# Patient Record
Sex: FEMALE | Race: WHITE | URBAN - NONMETROPOLITAN AREA
[De-identification: names, ages, dates, MRNs, and addresses within clinical notes are randomized per-mention and may not be internally consistent; named-entity substitution may affect disease eponyms.]

---

## 2017-01-01 ENCOUNTER — AMBULATORY - GICH (OUTPATIENT)
Dept: LAB | Facility: OTHER | Age: 82
End: 2017-01-01

## 2017-01-01 ENCOUNTER — OFFICE VISIT - GICH (OUTPATIENT)
Dept: INTERNAL MEDICINE | Facility: OTHER | Age: 82
End: 2017-01-01

## 2017-01-01 ENCOUNTER — COMMUNICATION - GICH (OUTPATIENT)
Dept: INTERNAL MEDICINE | Facility: OTHER | Age: 82
End: 2017-01-01

## 2017-01-01 ENCOUNTER — HISTORY (OUTPATIENT)
Dept: FAMILY MEDICINE | Facility: OTHER | Age: 82
End: 2017-01-01

## 2017-01-01 ENCOUNTER — AMBULATORY - GICH (OUTPATIENT)
Dept: INTERNAL MEDICINE | Facility: OTHER | Age: 82
End: 2017-01-01

## 2017-01-01 ENCOUNTER — OFFICE VISIT - GICH (OUTPATIENT)
Dept: FAMILY MEDICINE | Facility: OTHER | Age: 82
End: 2017-01-01

## 2017-01-01 ENCOUNTER — AMBULATORY - GICH (OUTPATIENT)
Dept: SCHEDULING | Facility: OTHER | Age: 82
End: 2017-01-01

## 2017-01-01 ENCOUNTER — HISTORY (OUTPATIENT)
Dept: INTERNAL MEDICINE | Facility: OTHER | Age: 82
End: 2017-01-01

## 2017-01-01 ENCOUNTER — HISTORY (OUTPATIENT)
Dept: EMERGENCY MEDICINE | Facility: OTHER | Age: 82
End: 2017-01-01

## 2017-01-01 ENCOUNTER — HOSPITAL ENCOUNTER (OUTPATIENT)
Dept: RADIOLOGY | Facility: OTHER | Age: 82
End: 2017-12-06
Attending: INTERNAL MEDICINE

## 2017-01-01 DIAGNOSIS — R42 DIZZINESS AND GIDDINESS: ICD-10-CM

## 2017-01-01 DIAGNOSIS — L30.9 DERMATITIS: ICD-10-CM

## 2017-01-01 DIAGNOSIS — E11.9 TYPE 2 DIABETES MELLITUS WITHOUT COMPLICATIONS (H): ICD-10-CM

## 2017-01-01 DIAGNOSIS — N18.4 CHRONIC KIDNEY DISEASE, STAGE IV (SEVERE) (H): ICD-10-CM

## 2017-01-01 DIAGNOSIS — I10 ESSENTIAL (PRIMARY) HYPERTENSION: ICD-10-CM

## 2017-01-01 DIAGNOSIS — Z95.0 PRESENCE OF CARDIAC PACEMAKER: ICD-10-CM

## 2017-01-01 DIAGNOSIS — L03.115 CELLULITIS OF RIGHT LOWER EXTREMITY: ICD-10-CM

## 2017-01-01 DIAGNOSIS — R53.1 WEAKNESS: ICD-10-CM

## 2017-01-01 DIAGNOSIS — R32 URINARY INCONTINENCE: ICD-10-CM

## 2017-01-01 DIAGNOSIS — R60.0 LOCALIZED EDEMA: ICD-10-CM

## 2017-01-01 DIAGNOSIS — D64.9 ANEMIA: ICD-10-CM

## 2017-01-01 DIAGNOSIS — N28.9 DISORDER OF KIDNEY AND URETER: ICD-10-CM

## 2017-01-01 DIAGNOSIS — I44.2 ATRIOVENTRICULAR BLOCK, COMPLETE (H): ICD-10-CM

## 2017-01-01 DIAGNOSIS — R21 RASH AND OTHER NONSPECIFIC SKIN ERUPTION: ICD-10-CM

## 2017-01-01 DIAGNOSIS — D63.8 ANEMIA IN OTHER CHRONIC DISEASES CLASSIFIED ELSEWHERE: ICD-10-CM

## 2017-01-01 DIAGNOSIS — N39.41 URGE INCONTINENCE: ICD-10-CM

## 2017-01-01 DIAGNOSIS — R79.89 OTHER SPECIFIED ABNORMAL FINDINGS OF BLOOD CHEMISTRY: ICD-10-CM

## 2017-01-01 DIAGNOSIS — F33.1 MAJOR DEPRESSIVE DISORDER, RECURRENT, MODERATE (H): ICD-10-CM

## 2017-01-01 DIAGNOSIS — M19.90 OSTEOARTHRITIS: ICD-10-CM

## 2017-01-01 DIAGNOSIS — M10.9 GOUT: ICD-10-CM

## 2017-01-01 DIAGNOSIS — M16.11 PRIMARY OSTEOARTHRITIS OF RIGHT HIP: ICD-10-CM

## 2017-01-01 DIAGNOSIS — R53.83 OTHER FATIGUE: ICD-10-CM

## 2017-01-01 DIAGNOSIS — I51.9 HEART DISEASE: ICD-10-CM

## 2017-01-01 DIAGNOSIS — K21.9 GASTRO-ESOPHAGEAL REFLUX DISEASE WITHOUT ESOPHAGITIS: ICD-10-CM

## 2017-01-01 DIAGNOSIS — M79.661 PAIN OF RIGHT LOWER LEG: ICD-10-CM

## 2017-01-01 DIAGNOSIS — N30.01 ACUTE CYSTITIS WITH HEMATURIA: ICD-10-CM

## 2017-01-01 LAB
ABSOLUTE BASOPHILS - HISTORICAL: 0 THOU/CU MM
ABSOLUTE BASOPHILS - HISTORICAL: 0.1 THOU/CU MM
ABSOLUTE EOSINOPHILS - HISTORICAL: 0.2 THOU/CU MM
ABSOLUTE EOSINOPHILS - HISTORICAL: 0.2 THOU/CU MM
ABSOLUTE EOSINOPHILS - HISTORICAL: 0.3 THOU/CU MM
ABSOLUTE EOSINOPHILS - HISTORICAL: 0.3 THOU/CU MM
ABSOLUTE IMMATURE GRANULOCYTES(METAS,MYELOS,PROS) - HISTORICAL: 0 THOU/CU MM
ABSOLUTE LYMPHOCYTES - HISTORICAL: 1.8 THOU/CU MM (ref 0.9–2.9)
ABSOLUTE LYMPHOCYTES - HISTORICAL: 2.1 THOU/CU MM (ref 0.9–2.9)
ABSOLUTE LYMPHOCYTES - HISTORICAL: 2.3 THOU/CU MM (ref 0.9–2.9)
ABSOLUTE LYMPHOCYTES - HISTORICAL: 2.4 THOU/CU MM (ref 0.9–2.9)
ABSOLUTE MONOCYTES - HISTORICAL: 0.6 THOU/CU MM
ABSOLUTE MONOCYTES - HISTORICAL: 0.6 THOU/CU MM
ABSOLUTE MONOCYTES - HISTORICAL: 0.7 THOU/CU MM
ABSOLUTE MONOCYTES - HISTORICAL: 0.7 THOU/CU MM
ABSOLUTE NEUTROPHILS - HISTORICAL: 3.1 THOU/CU MM (ref 1.7–7)
ABSOLUTE NEUTROPHILS - HISTORICAL: 3.2 THOU/CU MM (ref 1.7–7)
ABSOLUTE NEUTROPHILS - HISTORICAL: 4.1 THOU/CU MM (ref 1.7–7)
ABSOLUTE NEUTROPHILS - HISTORICAL: 5.2 THOU/CU MM (ref 1.7–7)
ALBUMIN SERPL-MCNC: 3 G/DL (ref 3.5–5.7)
ALBUMIN SERPL-MCNC: 3.2 G/DL (ref 3.5–5.7)
ANION GAP - HISTORICAL: 10 (ref 5–18)
ANION GAP - HISTORICAL: 10 (ref 5–18)
ANION GAP - HISTORICAL: 14 (ref 5–18)
ANION GAP - HISTORICAL: 6 (ref 5–18)
ANION GAP - HISTORICAL: 7 (ref 5–18)
ANION GAP - HISTORICAL: 9 (ref 5–18)
BACTERIA URINE: ABNORMAL BACTERIA/HPF
BASOPHILS # BLD AUTO: 0.4 %
BASOPHILS # BLD AUTO: 0.4 %
BASOPHILS # BLD AUTO: 0.6 %
BASOPHILS # BLD AUTO: 0.9 %
BILIRUB UR QL: NEGATIVE
BUN SERPL-MCNC: 60 MG/DL (ref 7–25)
BUN SERPL-MCNC: 69 MG/DL (ref 7–25)
BUN SERPL-MCNC: 70 MG/DL (ref 7–25)
BUN SERPL-MCNC: 73 MG/DL (ref 7–25)
BUN SERPL-MCNC: 76 MG/DL (ref 7–25)
BUN SERPL-MCNC: 80 MG/DL (ref 7–25)
BUN/CREAT RATIO - HISTORICAL: 16
BUN/CREAT RATIO - HISTORICAL: 17
BUN/CREAT RATIO - HISTORICAL: 18
BUN/CREAT RATIO - HISTORICAL: 19
BUN/CREAT RATIO - HISTORICAL: 20
BUN/CREAT RATIO - HISTORICAL: 22
CALCIUM SERPL-MCNC: 7.7 MG/DL (ref 8.6–10.3)
CALCIUM SERPL-MCNC: 7.8 MG/DL (ref 8.6–10.3)
CALCIUM SERPL-MCNC: 8 MG/DL (ref 8.6–10.3)
CALCIUM SERPL-MCNC: 8.2 MG/DL (ref 8.6–10.3)
CALCIUM SERPL-MCNC: 8.3 MG/DL (ref 8.6–10.3)
CALCIUM SERPL-MCNC: 8.9 MG/DL (ref 8.6–10.3)
CHLORIDE SERPLBLD-SCNC: 108 MMOL/L (ref 98–107)
CHLORIDE SERPLBLD-SCNC: 110 MMOL/L (ref 98–107)
CHLORIDE SERPLBLD-SCNC: 111 MMOL/L (ref 98–107)
CHLORIDE SERPLBLD-SCNC: 111 MMOL/L (ref 98–107)
CHLORIDE SERPLBLD-SCNC: 113 MMOL/L (ref 98–107)
CHLORIDE SERPLBLD-SCNC: 113 MMOL/L (ref 98–107)
CLARITY, URINE: ABNORMAL CLARITY
CLARITY, URINE: ABNORMAL CLARITY
CLARITY, URINE: CLEAR CLARITY
CO2 SERPL-SCNC: 17 MMOL/L (ref 21–31)
CO2 SERPL-SCNC: 17 MMOL/L (ref 21–31)
CO2 SERPL-SCNC: 18 MMOL/L (ref 21–31)
CO2 SERPL-SCNC: 19 MMOL/L (ref 21–31)
COLOR UR: YELLOW COLOR
CREAT SERPL-MCNC: 2.79 MG/DL (ref 0.7–1.3)
CREAT SERPL-MCNC: 3.71 MG/DL (ref 0.7–1.3)
CREAT SERPL-MCNC: 3.74 MG/DL (ref 0.7–1.3)
CREAT SERPL-MCNC: 4.06 MG/DL (ref 0.7–1.3)
CREAT SERPL-MCNC: 4.5 MG/DL (ref 0.7–1.3)
CREAT SERPL-MCNC: 4.66 MG/DL (ref 0.7–1.3)
CREATININE RANDOM URINE: 75.8 MG/DL (ref 47–110)
CULTURE - HISTORICAL: NORMAL
D-DIMER, QUANTITATIVE NG/ML - HISTORICAL: 917 NG/ML
ELP INTERP,SERUM - HISTORICAL: ABNORMAL
ELP,ALBUMIN - HISTORICAL: 2.83 G/DL (ref 3.31–5.31)
ELP,ALPHA 1 - HISTORICAL: 0.35 G/DL (ref 0.19–0.42)
ELP,ALPHA 2 - HISTORICAL: 0.85 G/DL (ref 0.44–1.03)
ELP,BETA - HISTORICAL: 0.68 G/DL (ref 0.52–1.05)
ELP,GAMMA - HISTORICAL: 0.9 G/DL (ref 0.59–1.46)
EOSINOPHIL NFR BLD AUTO: 3.2 %
EOSINOPHIL NFR BLD AUTO: 3.5 %
EOSINOPHIL NFR BLD AUTO: 3.8 %
EOSINOPHIL NFR BLD AUTO: 4.1 %
EPITHELIAL CELLS: ABNORMAL EPI/HPF
ERYTHROCYTE [DISTWIDTH] IN BLOOD BY AUTOMATED COUNT: 14.1 % (ref 11.5–15.5)
ERYTHROCYTE [DISTWIDTH] IN BLOOD BY AUTOMATED COUNT: 14.7 % (ref 11.5–15.5)
ERYTHROCYTE [DISTWIDTH] IN BLOOD BY AUTOMATED COUNT: 14.9 % (ref 11.5–15.5)
ERYTHROCYTE [DISTWIDTH] IN BLOOD BY AUTOMATED COUNT: 15.1 % (ref 11.5–15.5)
ERYTHROCYTE [SEDIMENTATION RATE] IN BLOOD: 82 MM/HR
FERRITIN SERPL-MCNC: 142.4 NG/ML (ref 23.9–336.2)
FOLATE: 14.7 NG/ML
GFR IF NOT AFRICAN AMERICAN - HISTORICAL: 10 ML/MIN/1.73M2
GFR IF NOT AFRICAN AMERICAN - HISTORICAL: 11 ML/MIN/1.73M2
GFR IF NOT AFRICAN AMERICAN - HISTORICAL: 12 ML/MIN/1.73M2
GFR IF NOT AFRICAN AMERICAN - HISTORICAL: 16 ML/MIN/1.73M2
GFR IF NOT AFRICAN AMERICAN - HISTORICAL: 9 ML/MIN/1.73M2
GFR IF NOT AFRICAN AMERICAN - HISTORICAL: 9 ML/MIN/1.73M2
GLUCOSE SERPL-MCNC: 132 MG/DL (ref 70–105)
GLUCOSE SERPL-MCNC: 137 MG/DL (ref 70–105)
GLUCOSE SERPL-MCNC: 83 MG/DL (ref 70–105)
GLUCOSE SERPL-MCNC: 86 MG/DL (ref 70–105)
GLUCOSE SERPL-MCNC: 87 MG/DL (ref 70–105)
GLUCOSE SERPL-MCNC: 94 MG/DL (ref 70–105)
GLUCOSE URINE: 100 MG/DL
GLUCOSE URINE: 100 MG/DL
GLUCOSE URINE: NEGATIVE MG/DL
HCT VFR BLD AUTO: 22.5 % (ref 33–51)
HCT VFR BLD AUTO: 23.3 % (ref 33–51)
HCT VFR BLD AUTO: 24.3 % (ref 33–51)
HCT VFR BLD AUTO: 25.7 % (ref 33–51)
HEMOGLOBIN: 7.3 G/DL (ref 12–16)
HEMOGLOBIN: 7.8 G/DL (ref 12–16)
HEMOGLOBIN: 7.8 G/DL (ref 12–16)
HEMOGLOBIN: 8.1 G/DL (ref 12–16)
HEMOGLOBIN: 8.5 G/DL (ref 12–16)
IMMATURE GRANULOCYTES(METAS,MYELOS,PROS) - HISTORICAL: 0.3 %
IMMATURE GRANULOCYTES(METAS,MYELOS,PROS) - HISTORICAL: 0.4 %
IRON BINDING CAP: 253.68 UG/DL (ref 245–400)
IRON SATURATION: 21 % (ref 20–55)
IRON: 54 UG/DL (ref 50–212)
KETONES UR QL: NEGATIVE MG/DL
LEUKOCYTE ESTERASE URINE: ABNORMAL
LYMPHOCYTES NFR BLD AUTO: 27.1 % (ref 20–44)
LYMPHOCYTES NFR BLD AUTO: 28.8 % (ref 20–44)
LYMPHOCYTES NFR BLD AUTO: 31.2 % (ref 20–44)
LYMPHOCYTES NFR BLD AUTO: 37.1 % (ref 20–44)
MCH RBC QN AUTO: 30.8 PG (ref 26–34)
MCH RBC QN AUTO: 31.1 PG (ref 26–34)
MCH RBC QN AUTO: 32 PG (ref 26–34)
MCH RBC QN AUTO: 32 PG (ref 26–34)
MCHC RBC AUTO-ENTMCNC: 32.1 G/DL (ref 32–36)
MCHC RBC AUTO-ENTMCNC: 32.4 G/DL (ref 32–36)
MCHC RBC AUTO-ENTMCNC: 33.1 G/DL (ref 32–36)
MCHC RBC AUTO-ENTMCNC: 33.5 G/DL (ref 32–36)
MCV RBC AUTO: 95 FL (ref 80–100)
MCV RBC AUTO: 96 FL (ref 80–100)
MCV RBC AUTO: 97 FL (ref 80–100)
MONOCYTES NFR BLD AUTO: 10.7 %
MONOCYTES NFR BLD AUTO: 8 %
MONOCYTES NFR BLD AUTO: 9 %
MONOCYTES NFR BLD AUTO: 9.8 %
NEUTROPHILS NFR BLD AUTO: 49.5 % (ref 42–72)
NEUTROPHILS NFR BLD AUTO: 53.1 % (ref 42–72)
NEUTROPHILS NFR BLD AUTO: 56.6 % (ref 42–72)
NEUTROPHILS NFR BLD AUTO: 60.9 % (ref 42–72)
NITRITE UR QL STRIP: NEGATIVE
NITRITE UR QL STRIP: NEGATIVE
NITRITE UR QL STRIP: POSITIVE
OCCULT BLOOD,URINE - HISTORICAL: ABNORMAL
PARATHYROID HORMONE INTACT: 223.5 PG/ML (ref 12–88)
PH UR: 6 [PH]
PHOSPHATE SERPL-MCNC: 5.9 MG/DL (ref 2.5–5)
PHOSPHATE SERPL-MCNC: 6.1 MG/DL (ref 2.5–5)
PLATELET # BLD AUTO: 214 THOU/CU MM (ref 140–440)
PLATELET # BLD AUTO: 231 THOU/CU MM (ref 140–440)
PLATELET # BLD AUTO: 239 THOU/CU MM (ref 140–440)
PLATELET # BLD AUTO: 262 THOU/CU MM (ref 140–440)
PMV BLD: 8.7 FL (ref 6.5–11)
PMV BLD: 8.8 FL (ref 6.5–11)
PMV BLD: 9.1 FL (ref 6.5–11)
PMV BLD: 9.4 FL (ref 6.5–11)
POTASSIUM SERPL-SCNC: 4.3 MMOL/L (ref 3.5–5.1)
POTASSIUM SERPL-SCNC: 4.6 MMOL/L (ref 3.5–5.1)
POTASSIUM SERPL-SCNC: 4.8 MMOL/L (ref 3.5–5.1)
POTASSIUM SERPL-SCNC: 4.8 MMOL/L (ref 3.5–5.1)
POTASSIUM SERPL-SCNC: 5.1 MMOL/L (ref 3.5–5.1)
POTASSIUM SERPL-SCNC: 5.5 MMOL/L (ref 3.5–5.1)
PROT SERPL-MCNC: 5.6 G/DL (ref 6–8)
PROT/CREAT RATIO,UR - HISTORICAL: 9.3
PROTEIN QUALITATIVE,URINE - HISTORICAL: 100 MG/DL
PROTEIN QUALITATIVE,URINE - HISTORICAL: 100 MG/DL
PROTEIN QUALITATIVE,URINE - HISTORICAL: >=300 MG/DL
PROTEIN QUANT,RAND URINE - HISTORICAL: 705 MG/DL (ref 1–14)
RBC - HISTORICAL: ABNORMAL /HPF
RED BLOOD COUNT - HISTORICAL: 2.35 MIL/CU MM (ref 4–5.2)
RED BLOOD COUNT - HISTORICAL: 2.44 MIL/CU MM (ref 4–5.2)
RED BLOOD COUNT - HISTORICAL: 2.53 MIL/CU MM (ref 4–5.2)
RED BLOOD COUNT - HISTORICAL: 2.66 MIL/CU MM (ref 4–5.2)
RENAL EPITHELIAL CELLS - HISTORICAL: ABNORMAL
SODIUM SERPL-SCNC: 137 MMOL/L (ref 133–143)
SODIUM SERPL-SCNC: 137 MMOL/L (ref 133–143)
SODIUM SERPL-SCNC: 138 MMOL/L (ref 133–143)
SODIUM SERPL-SCNC: 141 MMOL/L (ref 133–143)
SP GR UR STRIP: 1.02
UIBC (UNSATURATED) - HISTORICAL: 199.68 MG/DL
URATE SERPL-MCNC: 4.9 MG/DL (ref 4.4–7.6)
URATE SERPL-MCNC: 5.1 MG/DL (ref 4.4–7.6)
UROBILINOGEN,QUALITATIVE - HISTORICAL: NORMAL EU/DL
VIT B12 SERPL-MCNC: 298 PG/ML (ref 180–914)
WBC - HISTORICAL: >100 /HPF
WBC - HISTORICAL: ABNORMAL /HPF
WBC - HISTORICAL: ABNORMAL /HPF
WHITE BLOOD COUNT - HISTORICAL: 5.8 THOU/CU MM (ref 4.5–11)
WHITE BLOOD COUNT - HISTORICAL: 6.6 THOU/CU MM (ref 4.5–11)
WHITE BLOOD COUNT - HISTORICAL: 7.3 THOU/CU MM (ref 4.5–11)
WHITE BLOOD COUNT - HISTORICAL: 8.5 THOU/CU MM (ref 4.5–11)

## 2017-01-01 ASSESSMENT — PATIENT HEALTH QUESTIONNAIRE - PHQ9
SUM OF ALL RESPONSES TO PHQ QUESTIONS 1-9: 0

## 2017-01-18 ENCOUNTER — HISTORY (OUTPATIENT)
Dept: INTERNAL MEDICINE | Facility: OTHER | Age: 82
End: 2017-01-18

## 2017-01-18 ENCOUNTER — OFFICE VISIT - GICH (OUTPATIENT)
Dept: INTERNAL MEDICINE | Facility: OTHER | Age: 82
End: 2017-01-18

## 2017-01-18 DIAGNOSIS — F33.1 MAJOR DEPRESSIVE DISORDER, RECURRENT, MODERATE (H): ICD-10-CM

## 2017-01-18 DIAGNOSIS — R30.0 DYSURIA: ICD-10-CM

## 2017-01-18 DIAGNOSIS — I51.9 HEART DISEASE: ICD-10-CM

## 2017-01-18 DIAGNOSIS — E11.9 TYPE 2 DIABETES MELLITUS WITHOUT COMPLICATIONS (H): ICD-10-CM

## 2017-01-18 DIAGNOSIS — N39.41 URGE INCONTINENCE: ICD-10-CM

## 2017-01-18 DIAGNOSIS — N18.4 CHRONIC KIDNEY DISEASE, STAGE IV (SEVERE) (H): ICD-10-CM

## 2017-01-18 DIAGNOSIS — M10.9 GOUT: ICD-10-CM

## 2017-01-18 DIAGNOSIS — K21.9 GASTRO-ESOPHAGEAL REFLUX DISEASE WITHOUT ESOPHAGITIS: ICD-10-CM

## 2017-01-18 DIAGNOSIS — I10 ESSENTIAL (PRIMARY) HYPERTENSION: ICD-10-CM

## 2017-01-18 DIAGNOSIS — E03.9 HYPOTHYROIDISM: ICD-10-CM

## 2017-01-18 LAB
ANION GAP - HISTORICAL: 9 (ref 5–18)
BUN SERPL-MCNC: 44 MG/DL (ref 7–25)
BUN/CREAT RATIO - HISTORICAL: 18
CALCIUM SERPL-MCNC: 9.1 MG/DL (ref 8.6–10.3)
CHLORIDE SERPLBLD-SCNC: 107 MMOL/L (ref 98–107)
CO2 SERPL-SCNC: 22 MMOL/L (ref 21–31)
CREAT SERPL-MCNC: 2.38 MG/DL (ref 0.7–1.3)
ESTIMATED AVERAGE GLUCOSE: 105 MG/DL
GFR IF NOT AFRICAN AMERICAN - HISTORICAL: 19 ML/MIN/1.73M2
GLUCOSE SERPL-MCNC: 91 MG/DL (ref 70–105)
HEMOGLOBIN A1C MONITORING (POCT) - HISTORICAL: 5.3 % (ref 4–6.2)
HEMOGLOBIN: 9.6 G/DL (ref 12–16)
MCV RBC AUTO: 100 FL (ref 80–100)
POTASSIUM SERPL-SCNC: 4.6 MMOL/L (ref 3.5–5.1)
SODIUM SERPL-SCNC: 138 MMOL/L (ref 133–143)
T4 FREE SERPL-MCNC: 1.01 NG/DL (ref 0.58–1.64)
TSH - HISTORICAL: 1.27 UIU/ML (ref 0.34–5.6)

## 2017-01-24 ENCOUNTER — AMBULATORY - GICH (OUTPATIENT)
Dept: LAB | Facility: OTHER | Age: 82
End: 2017-01-24

## 2017-01-24 DIAGNOSIS — R30.0 DYSURIA: ICD-10-CM

## 2017-01-24 LAB
BACTERIA URINE: ABNORMAL BACTERIA/HPF
BILIRUB UR QL: NEGATIVE
CLARITY, URINE: CLEAR CLARITY
COLOR UR: YELLOW COLOR
EPITHELIAL CELLS: ABNORMAL EPI/HPF
GLUCOSE URINE: NEGATIVE MG/DL
KETONES UR QL: NEGATIVE MG/DL
LEUKOCYTE ESTERASE URINE: ABNORMAL
NITRITE UR QL STRIP: NEGATIVE
OCCULT BLOOD,URINE - HISTORICAL: ABNORMAL
PH UR: 5.5 [PH]
PROTEIN QUALITATIVE,URINE - HISTORICAL: >=300 MG/DL
RBC - HISTORICAL: ABNORMAL /HPF
SP GR UR STRIP: 1.02
UROBILINOGEN,QUALITATIVE - HISTORICAL: NORMAL EU/DL
WBC - HISTORICAL: >100 /HPF

## 2017-02-09 ENCOUNTER — HISTORY (OUTPATIENT)
Dept: EMERGENCY MEDICINE | Facility: OTHER | Age: 82
End: 2017-02-09

## 2017-03-15 ENCOUNTER — HISTORY (OUTPATIENT)
Dept: INTERNAL MEDICINE | Facility: OTHER | Age: 82
End: 2017-03-15

## 2017-03-15 ENCOUNTER — OFFICE VISIT - GICH (OUTPATIENT)
Dept: INTERNAL MEDICINE | Facility: OTHER | Age: 82
End: 2017-03-15

## 2017-03-15 DIAGNOSIS — I44.2 ATRIOVENTRICULAR BLOCK, COMPLETE (H): ICD-10-CM

## 2017-03-15 DIAGNOSIS — R55 SYNCOPE AND COLLAPSE: ICD-10-CM

## 2017-03-15 DIAGNOSIS — R21 RASH AND OTHER NONSPECIFIC SKIN ERUPTION: ICD-10-CM

## 2017-03-15 DIAGNOSIS — N18.4 CHRONIC KIDNEY DISEASE, STAGE IV (SEVERE) (H): ICD-10-CM

## 2017-03-15 LAB
ANION GAP - HISTORICAL: 10 (ref 5–18)
BUN SERPL-MCNC: 50 MG/DL (ref 7–25)
BUN/CREAT RATIO - HISTORICAL: 21
CALCIUM SERPL-MCNC: 8.9 MG/DL (ref 8.6–10.3)
CHLORIDE SERPLBLD-SCNC: 111 MMOL/L (ref 98–107)
CO2 SERPL-SCNC: 22 MMOL/L (ref 21–31)
CREAT SERPL-MCNC: 2.39 MG/DL (ref 0.7–1.3)
FERRITIN SERPL-MCNC: 111.8 NG/ML (ref 23.9–336.2)
GFR IF NOT AFRICAN AMERICAN - HISTORICAL: 19 ML/MIN/1.73M2
GLUCOSE SERPL-MCNC: 89 MG/DL (ref 70–105)
HEMOGLOBIN: 9 G/DL (ref 12–16)
IRON: 68 UG/DL (ref 50–212)
MCV RBC AUTO: 97 FL (ref 80–100)
POTASSIUM SERPL-SCNC: 4.5 MMOL/L (ref 3.5–5.1)
SODIUM SERPL-SCNC: 143 MMOL/L (ref 133–143)

## 2017-05-06 ENCOUNTER — HISTORY (OUTPATIENT)
Dept: EMERGENCY MEDICINE | Facility: OTHER | Age: 82
End: 2017-05-06

## 2017-12-27 NOTE — PROGRESS NOTES
Patient Information     Patient Name MRN Marisol Howell [id number] Female 10/5/1930      Progress Notes by Paul Morrison MD at 11/10/2017  1:20 PM     Author:  Paul Morrison MD Service:  (none) Author Type:  Physician     Filed:  11/10/2017  2:41 PM Encounter Date:  11/10/2017 Status:  Signed     :  Paul Morrison MD (Physician)            SUBJECTIVE:    Marisol Martin is a 87 y.o. female who presents for a recheck.    HPI Comments: This patient comes in today for follow-up. The last time she was in, her renal function had deteriorated. She was much more anemic than she used to be as well. I told her to stop her lisinopril and start taking iron which she had not been doing on a regular basis. She has done those things. Her swelling has been about the same. Energy level continues to be about the same which is not very good. She does not have any new issues or complaints. She was having some trouble taking the iron but is now taking it with some yogurt and that seems to work better. Her baseline creatinine is approximately 3. Her baseline hemoglobin is approximately 9.    With stopping her ACE inhibitor because of the renal dysfunction, her blood pressure has gone up. They are recording values in the range of 200+ systolic. We are not getting anywhere near that here in the clinic so that is somewhat confusing.      Allergies     Allergen  Reactions     Sulindac Stomach Upset   ,   Current Outpatient Prescriptions     Medication  Sig     acetaminophen (TYLENOL) 325 mg tablet 1 at bedtime     allopurinol (ZYLOPRIM) 100 mg tablet TAKE 1 TABLET BY MOUTH ONCE DAILY     amLODIPine (NORVASC) 5 mg tablet TAKE 1 TABLET BY MOUTH RODRIGUEZ Y     Cholecalciferol, Vitamin D3, (VITAMIN D-3) 2,000 unit tablet Take 1 tablet by mouth once daily.     ferrous sulfate, 65 mg elemental, (FEOSOL) tablet Take 1 tablet by mouth once daily with a meal.     levothyroxine (SYNTHROID) 75 mcg tablet TAKE 1 TABLET BY MOUTH  EVERY MORNING     meclizine (ANTIVERT) 12.5 mg tablet Take 0.5 tablets by mouth every 6 hours if needed.     omeprazole (PRILOSEC) 20 mg Delayed-Release capsule TAKE 1 CAPSULE BY MOUTH EVERY DAY WITH A MEAL (AM)     oxybutynin XL (DITROPAN XL) 10 mg CR tablet TAKE 1 TABLET BY MOUTH EVERY DAY (AM)     sertraline (ZOLOFT) 50 mg tablet TAKE 1 TABLET BY MOUTH EVERY MORNING     triamcinolone (ARISTOCORT; KENALOG) 0.1 % cream Apply  topically to affected area(s) 2 times daily.     No current facility-administered medications for this visit.      Medications have been reviewed by me and are current to the best of my knowledge and ability. ,   Past Medical History:     Diagnosis  Date     Chronic kidney disease (CKD), stage IV (severe) () 11/28/2014     Depression, major      Diabetes mellitus type II      Fall 2011    With C2 fracture      Gout      H/O chest pain 5/1995    Negative angiogram      H/O echocardiogram 8/2011    Normal left ventricular size and systolic function,EF 60%      History of endoscopy 8/2011    UGI, gastritis and duodenitis      Hypertension      Hypothyroid      Normal stress echocardiogram 2/2007    Dobutamine, negative      Open-angle glaucoma      Pacemaker 4/2008    Dual chamber for heartblock      RA (rheumatoid arthritis) ()      Syncopal episodes     Neg. eval. including EP studies    ,   Patient Active Problem List       Diagnosis  Date Noted     Urinary incontinence  08/09/2017     Primary osteoarthritis of right hip  08/09/2017     Syncope  03/15/2017     Back pain  05/22/2015     S/P cardiac pacemaker  03/04/2015     SSS (sick sinus syndrome) ()  03/04/2015     Cognitive decline  11/28/2014     Chronic kidney disease (CKD), stage IV (severe) ()  11/28/2014     ACP (advance care planning)  03/17/2014     DIZZINESS  01/19/2012     ANEMIA  12/15/2010     ARTHRITIS, RHEUMATOID  09/08/2010     DEPRESSION, MAJOR, RECURRENT, MODERATE  01/06/2010     HYPOTHYROIDISM       DEGENERATIVE  "JOINT DISEASE       HYPERTENSION       DIASTOLIC DYSFUNCTION       OBESITY       Diabetes mellitus without complication (HC)       diet controlled          GOUT       ATRIOVENTRICULAR BLOCK, 3RD DEGREE  11/15/2008     Pacemaker placed        ,   Past Surgical History:      Procedure  Laterality Date     COLONOSCOPY SCREENING  8/9/2002    Normal       COLONOSCOPY SCREENING  2008    Negative       ESOPHAGOGASTRODUODENOSCOPY      Acute duodenitis,reactive gastropathy       HERNIA REPAIR      Right       KNEE REPLACEMENT      Left       KNEE REPLACEMENT  2/2007    Right       LAP CHOLECYSTECTOMY  2007    For chronic cholelithiasis and cholecystitis       NEPHRECTOMY  2010    Left,  for benign mass       SHOULDER REPLACEMENT      Bilateral       THYROIDECTOMY  3/23/2006    Total, secondary to goiter      and   Social History     Substance Use Topics       Smoking status: Never Smoker     Smokeless tobacco: Never Used     Alcohol use No       REVIEW OF SYSTEMS:  Review of Systems   All other systems reviewed and are negative.      OBJECTIVE:  /84  Pulse 92  Ht 1.638 m (5' 4.5\")  Wt 88.9 kg (196 lb)  Breastfeeding? No  BMI 33.12 kg/m2    EXAM:   Physical Exam   Constitutional: She is well-developed, well-nourished, and in no distress. No distress.   Musculoskeletal: She exhibits edema.   2+ peripheral edema   Skin: She is not diaphoretic. There is pallor.   Nursing note and vitals reviewed.    I rechecked her pressure and it was 160/78.    ASSESSMENT/PLAN:    ICD-10-CM    1. Chronic kidney disease (CKD), stage IV (severe) (HC) N18.4 BASIC METABOLIC PANEL      BASIC METABOLIC PANEL   2. Anemia, unspecified type D64.9 HEMOGLOBIN      HEMOGLOBIN      IRON, TOTAL      IRON PLUS IRON BINDING CAP      VITAMIN B12      FERRITIN      FOLIC ACID   3. HYPERTENSION I10 metoprolol succinate (TOPROL XL) 50 mg sustained-release tablet   4. Urinary incontinence, unspecified type R32 DME        Plan:  Her anemia has improved " slightly. Her kidney function remains about the same which is severely impaired. Her blood pressure is of course a little bit high being off of the medications. I will have her start metoprolol 50 mg daily. Warned of possible side effects. Other medications will continue the same including her iron. I did do some iron studies as well as B12 and folate and I will let her know the results. She will recheck with me in a couple of weeks at which time we'll reassess her blood pressure and reassess her renal function and anemia. Further intervention and treatment depending on how things progress.

## 2017-12-27 NOTE — PROGRESS NOTES
Patient Information     Patient Name MRN Sex Marisol Ryan [id number] Female 10/5/1930      Progress Notes by Paul Morrison MD at 10/25/2017 11:20 AM     Author:  Paul Morrison MD Service:  (none) Author Type:  Physician     Filed:  10/25/2017 11:51 AM Encounter Date:  10/25/2017 Status:  Signed     :  Paul Morrison MD (Physician)            SUBJECTIVE:    Marisol Martin is a 87 y.o. female who presents for follow-up on multiple issues.    HPI Comments: She is here today for follow-up. She has chronic kidney disease and chronic anemia. She needs a recheck on those with lab work today. She continues to live at the [facility]. She's not exactly thrilled with this but it's definitely a safer place for her to be. Last time we got her a hospital bed, walker and wheelchair. She does try to keep her legs elevated in the evening to help with the edema that she accumulates. She would like to dose of meclizine changed, she only takes a half tablet when needed. She feels like she has a urine infection again today with urine frequency and burning. She continues to have overall weakness and fatigue. This is a chronic problem for her. She doesn't have any new or acute problems per se.    Her medications are reconciled. Past medical history and past social histories are reviewed.      Allergies     Allergen  Reactions     Sulindac Stomach Upset   ,   Current Outpatient Prescriptions     Medication  Sig     acetaminophen (TYLENOL) 325 mg tablet 1 at bedtime     allopurinol (ZYLOPRIM) 100 mg tablet TAKE 1 TABLET BY MOUTH ONCE DAILY     amLODIPine (NORVASC) 5 mg tablet TAKE 1 TABLET BY MOUTH RODRIGUEZ Y     aspirin (ECOTRIN) 81 mg enteric coated tablet TAKE 1 TABLET BY MOUTH ONCE DAILY (8AM)     Cholecalciferol, Vitamin D3, (VITAMIN D-3) 2,000 unit tablet Take 1 tablet by mouth once daily.     ciprofloxacin HCl (CIPRO) 250 mg tablet Take 1 tablet by mouth 2 times daily for 7 days.     COMBIGAN 0.2-0.5 % ophthalmic  solution INSTILL 1 DROP INTO EACH EYE AT BEDTIME     latanoprost (XALATAN) 0.005 % ophthalmic solution INSTILL 1 DROP INTO BOTH EYES NIGHTLY AT BEDTIME     levothyroxine (SYNTHROID) 75 mcg tablet TAKE 1 TABLET BY MOUTH EVERY MORNING     lisinopril (PRINIVIL; ZESTRIL) 20 mg tablet Take 1 tablet by mouth once daily.     meclizine (ANTIVERT) 12.5 mg tablet Take 0.5 tablets by mouth every 6 hours if needed.     omeprazole (PRILOSEC) 20 mg Delayed-Release capsule TAKE 1 CAPSULE BY MOUTH EVERY DAY WITH A MEAL (AM)     oxybutynin XL (DITROPAN XL) 10 mg CR tablet TAKE 1 TABLET BY MOUTH EVERY DAY (AM)     sertraline (ZOLOFT) 50 mg tablet TAKE 1 TABLET BY MOUTH EVERY MORNING     triamcinolone (ARISTOCORT; KENALOG) 0.1 % cream Apply  topically to affected area(s) 2 times daily.     No current facility-administered medications for this visit.      Medications have been reviewed by me and are current to the best of my knowledge and ability. ,   Past Medical History:     Diagnosis  Date     Chronic kidney disease (CKD), stage IV (severe) () 11/28/2014     Depression, major      Diabetes mellitus type II      Fall 2011    With C2 fracture      Gout      H/O chest pain 5/1995    Negative angiogram      H/O echocardiogram 8/2011    Normal left ventricular size and systolic function,EF 60%      History of endoscopy 8/2011    UGI, gastritis and duodenitis      Hypertension      Hypothyroid      Normal stress echocardiogram 2/2007    Dobutamine, negative      Open-angle glaucoma      Pacemaker 4/2008    Dual chamber for heartblock      RA (rheumatoid arthritis) ()      Syncopal episodes     Neg. eval. including EP studies    ,   Patient Active Problem List       Diagnosis  Date Noted     Urinary incontinence  08/09/2017     Primary osteoarthritis of right hip  08/09/2017     Syncope  03/15/2017     Back pain  05/22/2015     S/P cardiac pacemaker  03/04/2015     SSS (sick sinus syndrome) ()  03/04/2015     Cognitive decline   "11/28/2014     Chronic kidney disease (CKD), stage IV (severe) (HC)  11/28/2014     ACP (advance care planning)  03/17/2014     DIZZINESS  01/19/2012     ANEMIA  12/15/2010     ARTHRITIS, RHEUMATOID  09/08/2010     DEPRESSION, MAJOR, RECURRENT, MODERATE  01/06/2010     HYPOTHYROIDISM       DEGENERATIVE JOINT DISEASE       HYPERTENSION       DIASTOLIC DYSFUNCTION       OBESITY       Diabetes mellitus without complication (HC)       diet controlled          GOUT       ATRIOVENTRICULAR BLOCK, 3RD DEGREE  11/15/2008     Pacemaker placed        ,   Past Surgical History:      Procedure  Laterality Date     COLONOSCOPY SCREENING  8/9/2002    Normal       COLONOSCOPY SCREENING  2008    Negative       ESOPHAGOGASTRODUODENOSCOPY      Acute duodenitis,reactive gastropathy       HERNIA REPAIR      Right       KNEE REPLACEMENT      Left       KNEE REPLACEMENT  2/2007    Right       LAP CHOLECYSTECTOMY  2007    For chronic cholelithiasis and cholecystitis       NEPHRECTOMY  2010    Left,  for benign mass       SHOULDER REPLACEMENT      Bilateral       THYROIDECTOMY  3/23/2006    Total, secondary to goiter      and   Social History     Substance Use Topics       Smoking status: Never Smoker     Smokeless tobacco: Never Used     Alcohol use No       REVIEW OF SYSTEMS:  Review of Systems   All other systems reviewed and are negative.      OBJECTIVE:  /86  Pulse 72  Ht 1.638 m (5' 4.5\")  Wt 89.8 kg (198 lb)  Breastfeeding? No  BMI 33.46 kg/m2    EXAM:   Physical Exam   Constitutional: She is well-developed, well-nourished, and in no distress. No distress.   Cardiovascular: Normal rate, regular rhythm and normal heart sounds.    Pulmonary/Chest: Effort normal and breath sounds normal. No respiratory distress. She has no wheezes. She has no rales.   Musculoskeletal: She exhibits edema.   2+ edema with chronic stasis changes   Skin: She is not diaphoretic.   Nursing note and vitals reviewed.      ASSESSMENT/PLAN:    " ICD-10-CM    1. Fatigue, unspecified type R53.83 URINALYSIS W REFLEX MICROSCOPIC IF POSITIVE      URINALYSIS W REFLEX MICROSCOPIC IF POSITIVE      URINALYSIS MICROSCOPIC      URINALYSIS MICROSCOPIC   2. Dizzy R42 meclizine (ANTIVERT) 12.5 mg tablet   3. Dermatitis L30.9 triamcinolone (ARISTOCORT; KENALOG) 0.1 % cream   4. Chronic kidney disease (CKD), stage IV (severe) () N18.4 BASIC METABOLIC PANEL      BASIC METABOLIC PANEL   5. Anemia, unspecified type D64.9 CBC WITH DIFFERENTIAL      CBC WITH DIFFERENTIAL      CBC WITH AUTO DIFFERENTIAL   6. Acute cystitis with hematuria N30.01 ciprofloxacin HCl (CIPRO) 250 mg tablet        Plan:  Medications will essentially stay the same although I did change the meclizine to half tablet every 6 hours if needed. Her urine does show a UTI so I put her on Cipro 250 mg twice daily for 7 days, notify if not improving. Refilled the triamcinolone for her stasis dermatitis. Continue elevating legs to help with the edema, with her chronic kidney disease I don't want her on Lasix again unless needed. Lab is drawn and pending to reassess her kidney disease and anemia, I will send a letter with the results and any recommendations for change. Follow-up will be dependent on how she does and the results of her pending lab.

## 2017-12-27 NOTE — PROGRESS NOTES
Patient Information     Patient Name MRN Marisol Howell [id number] Female 10/5/1930      Progress Notes by Paul Morrison MD at 2017 10:20 AM     Author:  Paul Morrison MD Service:  (none) Author Type:  Physician     Filed:  2017 10:51 AM Encounter Date:  2017 Status:  Signed     :  Paul Morrison MD (Physician)            SUBJECTIVE:    Marisol Martin is a 86 y.o. female who presents for ER follow up.    HPI Comments: She comes in today after being seen in the emergency room. She was seen with weakness and increased debility. She was found to have a urinary tract infection and she was treated with Cipro. Because of her debility, she was also sent to the [facility] where she currently resides and plans to stay long-term. While in the emergency room, she was also noted to have significant anemia with hemoglobin of 9.0. Her creatinine was also elevated above baseline at 3.4. She needs a recheck on those as well.    She needs a walker all of the time but sometimes she also needs a wheelchair. The reason for the wheelchair is the fact that she is suffering from severe arthritis especially in her right hip. This limits her ability to ambulate. She also has generalized weakness which makes ambulation of any significant distance much more difficult for her.    She also needs a hospital bed. She has severe arthritis in her right hip and has difficulty getting in and out of a regular bed. She does need one that can be adjusted to alleviate her pain as well as to facilitate her getting in and out of the bed.    She tells me that she likes being at the [facility]. Her appetite is good and she is drinking plenty of fluids. She has no other complaints or concerns other than the fact that she has chronic urinary incontinence and needs to use depends.      Allergies     Allergen  Reactions     Sulindac Stomach Upset   ,   Current Outpatient Prescriptions     Medication  Sig     acetaminophen  160 mg/5 mL oral liquid Take 15 mL by mouth every 4 hours if needed for Pain. Max acetaminophen dose: 4000mg in 24 hrs.     allopurinol (ZYLOPRIM) 100 mg tablet Take 1 tablet by mouth once daily.     amLODIPine (NORVASC) 5 mg tablet TAKE 1 TABLET BY MOUTH RODRIGUEZ Y     aspirin 81 mg tablet Take 81 mg by mouth once daily with a meal.     betamethasone valerate (VALISONE) 0.1 % cream APPLY TOPICALLY TO AFFECTED AREA(S) TWICE DAILY     Cholecalciferol, Vitamin D3, (VITAMIN D-3) 2,000 unit tablet Take 2,000 Units by mouth once daily.     COMBIGAN 0.2-0.5 % ophthalmic solution INSTILL 1 DROP INTO EACH EYE AT BEDTIME     furosemide (LASIX) 40 mg tablet Take 1 tablet by mouth once daily if needed.     latanoprost (XALATAN) 0.005 % ophthalmic solution INSTILL 1 DROP INTO BOTH EYES NIGHTLY AT BEDTIME     levothyroxine (SYNTHROID) 75 mcg tablet TAKE 1 TABLET BY MOUTH RODRIGUEZ Y     lisinopril (PRINIVIL; ZESTRIL) 20 mg tablet Take 1 tablet by mouth once daily.     meclizine (ANTIVERT) 12.5 mg tablet Take 1 tablet by mouth every 6 hours if needed.     omeprazole (PRILOSEC) 20 mg Delayed-Release capsule TAKE 1 CAPSULE BY MOUTH EVERY DAY WITH A MEAL (AM)     oxybutynin XL (DITROPAN XL) 10 mg CR tablet TAKE 1 TABLET BY MOUTH EVERY DAY (AM)     sertraline (ZOLOFT) 50 mg tablet TAKE 1 TABLET BY MOUTH EVERY MORNING     triamcinolone (ARISTOCORT; KENALOG) 0.1 % cream Apply  topically to affected area(s) 2 times daily.     No current facility-administered medications for this visit.      Medications have been reviewed by me and are current to the best of my knowledge and ability. ,   Past Medical History:     Diagnosis  Date     Chronic kidney disease (CKD), stage IV (severe) () 11/28/2014     Depression, major      Diabetes mellitus type II      Fall 2011    With C2 fracture      Gout      H/O chest pain 5/1995    Negative angiogram      H/O echocardiogram 8/2011    Normal left ventricular size and systolic function,EF 60%      History of  endoscopy 8/2011    UGI, gastritis and duodenitis      Hypertension      Hypothyroid      Normal stress echocardiogram 2/2007    Dobutamine, negative      Open-angle glaucoma      Pacemaker 4/2008    Dual chamber for heartblock      RA (rheumatoid arthritis) (HC)      Syncopal episodes     Neg. eval. including EP studies    ,   Patient Active Problem List       Diagnosis  Date Noted     Urinary incontinence  08/09/2017     Primary osteoarthritis of right hip  08/09/2017     Syncope  03/15/2017     Back pain  05/22/2015     S/P cardiac pacemaker  03/04/2015     SSS (sick sinus syndrome) (HC)  03/04/2015     Cognitive decline  11/28/2014     Chronic kidney disease (CKD), stage IV (severe) ()  11/28/2014     ACP (advance care planning)  03/17/2014     DIZZINESS  01/19/2012     ANEMIA  12/15/2010     ARTHRITIS, RHEUMATOID  09/08/2010     DEPRESSION, MAJOR, RECURRENT, MODERATE  01/06/2010     HYPOTHYROIDISM       DEGENERATIVE JOINT DISEASE       HYPERTENSION       DIASTOLIC DYSFUNCTION       OBESITY       Diabetes mellitus without complication ()       diet controlled          GOUT       ATRIOVENTRICULAR BLOCK, 3RD DEGREE  11/15/2008     Pacemaker placed        ,   Past Surgical History:      Procedure  Laterality Date     COLONOSCOPY SCREENING  8/9/2002    Normal       COLONOSCOPY SCREENING  2008    Negative       ESOPHAGOGASTRODUODENOSCOPY      Acute duodenitis,reactive gastropathy       HERNIA REPAIR      Right       KNEE REPLACEMENT      Left       KNEE REPLACEMENT  2/2007    Right       LAP CHOLECYSTECTOMY  2007    For chronic cholelithiasis and cholecystitis       NEPHRECTOMY  2010    Left,  for benign mass       SHOULDER REPLACEMENT      Bilateral       THYROIDECTOMY  3/23/2006    Total, secondary to goiter      and   Social History     Substance Use Topics       Smoking status: Never Smoker     Smokeless tobacco: Never Used     Alcohol use No       REVIEW OF SYSTEMS:  Review of Systems   All other systems  "reviewed and are negative.      OBJECTIVE:  /84  Pulse 76  Ht 1.638 m (5' 4.5\")  Wt 86.5 kg (190 lb 9.6 oz)  Breastfeeding? No  BMI 32.21 kg/m2    EXAM:   Physical Exam   Constitutional: She is well-developed, well-nourished, and in no distress. No distress.   Uses a walker   Cardiovascular: Normal rate and regular rhythm.    Murmur heard.   Systolic murmur is present with a grade of 3/6   Pulmonary/Chest: Effort normal and breath sounds normal. No respiratory distress. She has no wheezes. She has no rales.   Musculoskeletal: She exhibits edema.   1-2+ pretibial edema   Skin: She is not diaphoretic.   Nursing note and vitals reviewed.      ASSESSMENT/PLAN:    ICD-10-CM    1. Chronic kidney disease (CKD), stage IV (severe) () N18.4 URINALYSIS W REFLEX MICROSCOPIC IF POSITIVE      CBC WITH DIFFERENTIAL      BASIC METABOLIC PANEL   2. Anemia, unspecified type D64.9    3. Diabetes mellitus without complication () E11.9    4. Acute cystitis with hematuria N30.01    5. Weakness R53.1 Hospital Bed      Wheel Chair      DISCONTINUED: Wheel Chair   6. Urinary incontinence, unspecified type R32 DME      DISCONTINUED: DME   7. Primary osteoarthritis of right hip M16.11 Hospital Bed      Wheel Chair      DISCONTINUED: Wheel Chair        Plan:  Medications are reconciled and adjusted today. I did take her off of the furosemide for now as she really hasn't been using it at all. Lab recheck pending to reassess her urine, renal function and anemia. I will send a letter with the results. Follow-up with me will be dependent on her lab results as well as how she does overall. They will let me know if she has any problems. Prescription given today for depends because of urinary incontinence as well as for a wheelchair and a hospital bed, both of which are necessary due to her medical condition. Other medications are continued without change.          "

## 2017-12-28 NOTE — TELEPHONE ENCOUNTER
Patient Information     Patient Name MRN Sex Marisol Ryan [id number] Female 10/5/1930      Telephone Encounter by Paul Morrison MD at 2017 12:11 PM     Author:  Paul Morrison MD Service:  (none) Author Type:  Physician     Filed:  2017 12:11 PM Encounter Date:  2017 Status:  Signed     :  Paul Morrison MD (Physician)            I do not refill eyedrops for glaucoma

## 2017-12-28 NOTE — TELEPHONE ENCOUNTER
Patient Information     Patient Name MRN Marisol Howell [id number] Female 10/5/1930      Telephone Encounter by Chelsie Phan RN at 2017  3:10 PM     Author:  Chelsie Phan RN Service:  (none) Author Type:  NURS- Registered Nurse     Filed:  2017  3:18 PM Encounter Date:  2017 Status:  Signed     :  Chelsie Phan RN (NURS- Registered Nurse)            Pharmacy change from [facility] to [facility].  Remainder of Rx sent to [facility]    This is a Refill request from: [facility]  Name of Medication: betamethasone  Quantity requested: 60 g  Last fill date: 16  Last visit with PAUL ASH was on: 03/15/2017 in [facility]  PCP:  Paul Ash MD  Controlled Substance Agreement:  na   Diagnosis r/t this medication request: rash     Unable to complete prescription refill per RN Medication Refill Policy.................... Chelsie Phan RN ....................  2017   3:13 PM     Proton Pump Inhibitors    Office visit in the past 12 months or per provider note.    Last visit with PAUL ASH was on: 03/15/2017 in [facility]  Next visit with PAUL ASH is on: 2017 in [facility]  Next visit with Internal Medicine is on: 2017 in [facility]    Max refill for 12 months from last office visit or per provider note.    Office visit in the past 12 months or per provider note.    Last visit with PAUL ASH was on: 03/15/2017 in [facility]  Next visit with ASHPAUL JORDAN is on: 2017 in [facility]  Next visit with Internal Medicine is on: 2017 in [facility]    Max refill for 12 months from last office visit or per provider note.    Depression-in adults 18 and over  SSRI    Office visit in the past 12 months or as indicated in chart.  Should have clinic visit 1-2 months after initial prescription.    Last visit with PAUL ASH was on: 03/15/2017 in [facility]  Next  visit with BLAINE ASH is on: 08/09/2017 in [facility]  Next visit with Internal Medicine is on: 08/09/2017 in [facility]    Max refills 12 months from last office visit or per providers notes.    Prescription refilled per RN Medication Refill Policy.................... Chelsie Phan RN ....................  8/4/2017   3:13 PM

## 2017-12-28 NOTE — TELEPHONE ENCOUNTER
Addended by: KANDY SOLOMON on: 10/3/2017 08:47 AM     Modules accepted: Orders     Patient Information     Patient Name MRN Sex Marisol Ryan [id number] Female 10/5/1930      Telephone Encounter by Ayleen Yoder RN at 2017 12:10 PM     Author:  Ayleen Yoder RN  Service:  (none) Author Type:  NURS- Registered Nurse     Filed:  2017 12:35 PM  Encounter Date:  2017 Status:  Addendum     :  Ayleen Yoder RN (NURS- Registered Nurse)        Related Notes: Original Note by Ayleen Yoder RN (NURS- Registered Nurse) filed at 2017 12:22 PM            In clinical absence of patient's primary, Paul Morrison MD, patient is requesting that this message be sent to the Doc of the Day for consideration please.       ,  PT does not have an appointment with PCP until 11/15/2017.  Pt's daughter calls stating pt reports severe stomach cramps from her iron tablet.  Pt has tried an iron tablet years ago and seems to remember similar symptoms.  At this time she is refusing to take the tablet from [facility] staffing.  She did start an antibiotic for urinary infection on 10/25/2017, the same day as the iron supplement.  Daughter is wondering if there is treatment besides the iron tablet, pt's Hemoglobin is low at 7.8.  The following labs were drawn 10/25/2017, which is a decrease from similar labs drawn on 2017.  Please review and advise.    10/25/2017  WBC: 7.3                      10/25/2017  RBC: 2.53 L  10/25/2017  HGB: 7.8 L  10/25/2017  HCT: 24.3 L  10/25/2017  MCV: 96    10/25/2017  MCH: 30.8   10/25/2017 MCHC: 32.1   10/25/2017  RDW: 14.7   10/25/2017  PLT: 231

## 2017-12-28 NOTE — TELEPHONE ENCOUNTER
Patient Information     Patient Name MRN Marisol Howell [id number] Female 10/5/1930      Telephone Encounter by Jayleen Galeana RN at 2017  2:39 PM     Author:  Jayleen Galeana RN Service:  (none) Author Type:  NURS- Registered Nurse     Filed:  2017  2:40 PM Encounter Date:  2017 Status:  Signed     :  Jayleen Galeana RN (NURS- Registered Nurse)            Pharmacy notified of below.  JAYLEEN GALEANA RN ....................  2017   2:40 PM

## 2017-12-28 NOTE — TELEPHONE ENCOUNTER
Patient Information     Patient Name MRN Marisol Howell [id number] Female 10/5/1930      Telephone Encounter by Shameka Luna DO at 2017 12:34 PM     Author:  Shameka Luna DO Service:  (none) Author Type:  PHYS- Osteopathic     Filed:  2017 12:35 PM Encounter Date:  2017 Status:  Signed     :  Shameka Luna DO (PHYS- Osteopathic)            The alternative option would be to do iron infusions.  This would mean that she would have to be brought into the clinic 5 times over the next few weeks.  If they are interested in this I can place an order and they can discontinue the oral supplement.    If she does continue to have abdominal pain despite stopping the iron or if she develops any other symptoms including diarrhea, nausea, blood in the stool she needs to be seen acutely.

## 2017-12-28 NOTE — TELEPHONE ENCOUNTER
Patient Information     Patient Name MRN Marisol Howell [id number] Female 10/5/1930      Telephone Encounter by Mariola Abdalla LPN at 10/25/2017  3:20 PM     Author:  Mariola Abdalla LPN Service:  (none) Author Type:  NURS- Licensed Practical Nurse     Filed:  10/25/2017  3:20 PM Encounter Date:  10/25/2017 Status:  Signed     :  Mariola Abdalla LPN (NURS- Licensed Practical Nurse)            Contacted javi and gave her the information below. She stated the patient is out of meclizine and they need a new order sent in please.  Mariola Abdalla LPN......10/25/2017  3:20 PM

## 2017-12-28 NOTE — TELEPHONE ENCOUNTER
Patient Information     Patient Name MRN Sex Marisol Ryan [id number] Female 10/5/1930      Telephone Encounter by Ana Rosa Brown at 2017 11:57 AM     Author:  Ana Rosa Brown Service:  (none) Author Type:  (none)     Filed:  2017 12:03 PM Encounter Date:  2017 Status:  Signed     :  Ana Rosa Brown            MAF - Daughter states that patient is having difficulty taking iron supplement and wondering about something different.  Daughter is aware that provider is out until next week.  Please call.    Ana Rosa Brown ....................  2017   12:03 PM

## 2017-12-28 NOTE — TELEPHONE ENCOUNTER
Patient Information     Patient Name MRN Marisol Howell [id number] Female 10/5/1930      Telephone Encounter by Chelsie Cooper RN at 10/2/2017 10:02 AM     Author:  Chelsie Cooper RN Service:  (none) Author Type:  NURS- Registered Nurse     Filed:  10/2/2017 10:04 AM Encounter Date:  10/2/2017 Status:  Signed     :  Chelsie Cooper RN (NURS- Registered Nurse)            Error -duplicate. See refill request.   Chelsie Cooper RN ....................  10/2/2017   10:03 AM

## 2017-12-28 NOTE — TELEPHONE ENCOUNTER
Patient Information     Patient Name MRN Sex Marisol Ryan [id number] Female 10/5/1930      Telephone Encounter by Mariola Abdalla LPN at 2017 12:12 PM     Author:  Mariola Abdalla LPN Service:  (none) Author Type:  NURS- Licensed Practical Nurse     Filed:  2017 12:12 PM Encounter Date:  2017 Status:  Signed     :  Mariola Abdalla LPN (NURS- Licensed Practical Nurse)            Left a message for the patients daughter to call back.  Mariola Abdalla LPN......2017  12:12 PM

## 2017-12-28 NOTE — TELEPHONE ENCOUNTER
Patient Information     Patient Name MRN Sex Marisol Ryan [id number] Female 10/5/1930      Telephone Encounter by Ana Rosa Brown at 2017 12:04 PM     Author:  Ana Rosa Brown Service:  (none) Author Type:  (none)     Filed:  2017 12:07 PM Encounter Date:  2017 Status:  Signed     :  Ana Rosa Brown            MAF - Daughter is aware that provider is out until next week.   Daughter stated that patient was instructed to f/u with provider in 2 weeks. Patient is scheduled to see provider on 17 but daughter is requesting an appointment on 11/10/17.  Please call.    Ana Rosa Brown ....................  2017   12:07 PM

## 2017-12-28 NOTE — TELEPHONE ENCOUNTER
Patient Information     Patient Name MRN Sex Marisol Ryan [id number] Female 10/5/1930      Telephone Encounter by Jayleen Galeana RN at 2017 10:08 AM     Author:  Jayleen Galeana RN Service:  (none) Author Type:  NURS- Registered Nurse     Filed:  2017 10:16 AM Encounter Date:  2017 Status:  Signed     :  Jayleen Galeana RN (NURS- Registered Nurse)            Returned call to [place].  Pharmacy looking for refill on eye drops for patient, Latanoprost and per refill request on 17 per Dr. Morrison he does not refill eyedrops for glaucoma.  Pharmacy was notified at this time also.  They will send request to eye doctor.  JAYLEEN GALEANA RN ....................  2017   10:16 AM

## 2017-12-28 NOTE — TELEPHONE ENCOUNTER
Patient Information     Patient Name MRN Sex Marisol Ryan [id number] Female 10/5/1930      Telephone Encounter by Paul Morrison MD at 10/2/2017  6:38 AM     Author:  Paul Morrison MD Service:  (none) Author Type:  Physician     Filed:  10/2/2017  6:38 AM Encounter Date:  2017 Status:  Signed     :  Paul Morrison MD (Physician)            I don't fill ophthalmologic prescriptions

## 2017-12-28 NOTE — TELEPHONE ENCOUNTER
Patient Information     Patient Name MRN Sex Marisol Ryan [id number] Female 10/5/1930      Telephone Encounter by Lita Ashby at 10/25/2017  2:54 PM     Author:  Lita Ashby Service:  (none) Author Type:  (none)     Filed:  10/25/2017  2:54 PM Encounter Date:  10/25/2017 Status:  Signed     :  Lita Ashby            MAF- Patient facility nurse needs clarification on medication orders.

## 2017-12-28 NOTE — TELEPHONE ENCOUNTER
Patient Information     Patient Name MRN Sex Marisol Ryan [id number] Female 10/5/1930      Telephone Encounter by Chelsie Cooper RN at 10/2/2017  3:16 PM     Author:  Chelsie Cooper RN Service:  (none) Author Type:  NURS- Registered Nurse     Filed:  10/2/2017  3:26 PM Encounter Date:  10/2/2017 Status:  Signed     :  Chelsie Cooper RN (NURS- Registered Nurse)            Vitamin D Over the Counter only    Office visit in the past 12 months or per provider note.    Last visit with BLAINE ASH was on: 2017 in [facility]  Next visit with BLAINE ASH is on: No future appointment listed with this provider  Next visit with Internal Medicine is on: No future appointment listed in this department    Max refill for 12 months from last office visit or per provider note.  Prescription refilled per RN Medication Refill Policy.................... Chelsie Cooper RN ....................  10/2/2017   3:22 PM

## 2017-12-28 NOTE — TELEPHONE ENCOUNTER
Patient Information     Patient Name MRN Sex Marisol Ryan [id number] Female 10/5/1930      Telephone Encounter by Mariola Abdalla LPN at 2017 12:19 PM     Author:  Mariola Abdalla LPN Service:  (none) Author Type:  NURS- Licensed Practical Nurse     Filed:  2017 12:20 PM Encounter Date:  2017 Status:  Signed     :  Mariola Abdalla LPN (NURS- Licensed Practical Nurse)            Contacted the patients daughter and gave them the appointment time 11-10 at 1:20pm.  Mariola Abdalla LPN......2017  12:19 PM

## 2017-12-28 NOTE — TELEPHONE ENCOUNTER
Patient Information     Patient Name MRN Marisol Howell [id number] Female 10/5/1930      Telephone Encounter by Paul Morrison MD at 10/25/2017  3:13 PM     Author:  Paul Morrison MD Service:  (none) Author Type:  Physician     Filed:  10/25/2017  3:14 PM Encounter Date:  10/25/2017 Status:  Signed     :  Paul Morrison MD (Physician)            The Cipro was for 7 days. That was an error. I did send a prescription for iron to the pharmacy. She is to discontinue aspirin and lisinopril. I do not want her on cranberry tablets. I would presume that she already has meclizine on hand, if not I can certainly send a new prescription.

## 2017-12-28 NOTE — TELEPHONE ENCOUNTER
Patient Information     Patient Name MRN Sex Marisol Ryan [id number] Female 10/5/1930      Telephone Encounter by Mariola Abdalla LPN at 10/25/2017  3:02 PM     Author:  Mariola Abdalla LPN Service:  (none) Author Type:  NURS- Licensed Practical Nurse     Filed:  10/25/2017  3:06 PM Encounter Date:  10/25/2017 Status:  Signed     :  Mariola Abdalla LPN (NURS- Licensed Practical Nurse)             Contacted javi at [facility] she stated the patient was suppose to start cipro 250mg r10vdng in note but script sent to [facility] was only for 7 days. Than Paul Morrison MD note stated to start her on iron daily, but the next sentence says discontinue iron and lisinopril.they need clarification on the iron. They need new orders sent to [facility] for the iron and the meclizine. She is also wondering if they could get a script sent for cranberry tablet for recurring uti's.   Mariola Abdalla LPN......10/25/2017  3:05 PM

## 2017-12-28 NOTE — TELEPHONE ENCOUNTER
Patient Information     Patient Name MRN Marisol Howell [id number] Female 10/5/1930      Telephone Encounter by Jayleen Rosenbaum RN at 2017 12:02 PM     Author:  Jayleen Rosenbaum RN Service:  (none) Author Type:  NURS- Registered Nurse     Filed:  2017 12:04 PM Encounter Date:  2017 Status:  Signed     :  Jayleen Rosenbaum RN (NURS- Registered Nurse)            latanoprost (XALATAN) 0.005 % ophthalmic solution  INSTILL 1 DROP IN EACH EYE NIGHTLY AT BEDTIME       Disp: 2.5 mL Refills:     Class: eRx Start: 2017    Documented:1 month ago  Last refill:2017  To be filled at: [facility] [address] [facility] [address]: [phone]    Last visit with BLAINE ASH was on: 2017 in [facility]  PCP:  Blaine Ash MD     Unable to complete prescription refill per RN Medication Refill Policy.................... JAYLEEN ROSENBAUM RN ....................  2017   12:02 PM

## 2017-12-28 NOTE — TELEPHONE ENCOUNTER
Patient Information     Patient Name MRN Sex Marisol Ryan [id number] Female 10/5/1930      Telephone Encounter by Eden Bauer RN at 2017 11:09 AM     Author:  Eden Bauer RN Service:  (none) Author Type:  NURS- Registered Nurse     Filed:  2017 11:11 AM Encounter Date:  8/15/2017 Status:  Signed     :  Eden Bauer RN (NURS- Registered Nurse)            GOUT    Office visit in the past 12 months or per provider note.    Last visit with BLAINE ASH was on: 2017 in [facility]  Next visit with BLAINE ASH is on: No future appointment listed with this provider  Next visit with Internal Medicine is on: No future appointment listed in this department    Max refill for 12 months from last office visit or per provider note.  Hypothyroidism    Office visit in the past 12 months or per provider note.      Lab testing requirements:  TSH annually  TSH (uIU/mL)    Date Value   2017 1.27       Max refill for 12 months from last office visit or per provider note.  Office visit in the past 12 months or per provider note.    Max refill for 12 months from last office visit or per provider note.  Prescription refilled per RN Medication Refill Policy.................... EDEN BAUER RN ....................  2017   11:10 AM

## 2017-12-28 NOTE — TELEPHONE ENCOUNTER
Patient Information     Patient Name MRN Marisol Howell [id number] Female 10/5/1930      Telephone Encounter by Chelsie Cooper RN at 10/2/2017 10:05 AM     Author:  Chelsie Cooper RN Service:  (none) Author Type:  NURS- Registered Nurse     Filed:  10/2/2017 10:11 AM Encounter Date:  10/2/2017 Status:  Signed     :  Chelsie Cooper RN (NURS- Registered Nurse)            Per daughter, Kyara, she will call [facility] and ask them to request refill of eye drops from her mother's eye doctor.  Gave the phone number to [facility]. Daughter stated seh was happy to call and make request to contact appropriate doctor for this medication.   Chelsie Cooper RN ....................  10/2/2017   10:11 AM

## 2017-12-28 NOTE — TELEPHONE ENCOUNTER
Patient Information     Patient Name MRN Sex Marisol Ryan [id number] Female 10/5/1930      Telephone Encounter by Chelsie Cooper RN at 10/4/2017  4:15 PM     Author:  Chelsie Cooper RN Service:  (none) Author Type:  NURS- Registered Nurse     Filed:  10/4/2017  4:52 PM Encounter Date:  10/4/2017 Status:  Signed     :  Chelsie Cooper RN (NURS- Registered Nurse)            Fax received from [facility]. Pharmacy is questioning correct dose of Vitamin D. Reviewed chart and medication list. Called pharmacy to let know that patient orders for Vitamin D state 2000 units. No history of a 400mg dose in chart. Pharmacist, Kimberly, stated understanding and that it looks like 400 unit dose was on discharge orders from [facility]. Per Kimberly will update system for 2000 units of cholecalciferol as ordered by Paul Morrison MD.     Chelsie Cooper RN ....................  10/4/2017   4:50 PM

## 2017-12-28 NOTE — TELEPHONE ENCOUNTER
Patient Information     Patient Name MRN Sex Marisol Ryan [id number] Female 10/5/1930      Telephone Encounter by Chelsie Cooper RN at 2017  4:16 PM     Author:  Chelsie Cooper RN Service:  (none) Author Type:  NURS- Registered Nurse     Filed:  2017  4:21 PM Encounter Date:  2017 Status:  Signed     :  Chelsie Cooper RN (NURS- Registered Nurse)            Latanoprost not on RN refill protocol. Routed to Paul Ash MD for consideration.   Unable to complete prescription refill per RN Medication Refill Policy.................... Chelsie Cooper RN ....................  2017   4:21 PM    This is a Refill request from: [facility]    Medication: Latanoprost (XALATAN) 0.005 % ophthalmic solution    Qty:            Ref:3  Start:2017    End:              Route:                    Class:Historical Med    Sig:INSTILL 1 DROP INTO BOTH EYES NIGHTLY AT BEDTIME     Quantity requested: 2.5mL X 3 refills  Last fill date: 17, historical med  Due for refill: yes  Last visit with PAUL ASH was on: 2017 in [facility]  PCP:  Paul Ash MD  Controlled Substance Agreement:  n/a   Diagnosis r/t this medication request: None linked in chart - not found.     Unable to complete prescription refill per RN Medication Refill Policy.................... Chelsie Cooper RN ....................  2017   4:19 PM

## 2017-12-28 NOTE — ADDENDUM NOTE
Patient Information     Patient Name MRN Sex Marisol Ryan [id number] Female 10/5/1930      Addendum Note by Paul Ash MD at 10/25/2017 12:38 PM     Author:  Paul Ash MD Service:  (none) Author Type:  Physician     Filed:  10/25/2017 12:38 PM Encounter Date:  10/25/2017 Status:  Signed     :  Paul Ash MD (Physician)       Addended by: PAUL ASH on: 10/25/2017 12:38 PM        Modules accepted: Orders

## 2017-12-28 NOTE — TELEPHONE ENCOUNTER
Patient Information     Patient Name MRN Marisol Howell [id number] Female 10/5/1930      Telephone Encounter by Ayleen Yoder RN at 2017 12:39 PM     Author:  Ayleen Yoder RN Service:  (none) Author Type:  NURS- Registered Nurse     Filed:  2017 12:39 PM Encounter Date:  2017 Status:  Signed     :  Ayleen Yoder RN (NURS- Registered Nurse)            Left message at [phone] to call back  ....................Ayleen Yoder RN  2017   12:39 PM

## 2017-12-28 NOTE — TELEPHONE ENCOUNTER
Patient Information     Patient Name MRN Marisol Howell [id number] Female 10/5/1930      Telephone Encounter by Ayleen Yoder RN at 2017 12:56 PM     Author:  Ayleen Yoder RN Service:  (none) Author Type:  NURS- Registered Nurse     Filed:  2017 12:58 PM Encounter Date:  2017 Status:  Signed     :  Ayleen Yoder RN (NURS- Registered Nurse)            Thank you ,  Pt's daughter was notified of alternative.  Daughter does not think pt will want to do infusions, she is going to explain this to her mother and she will continue to take the tablet form or will call for an order to be placed.  Ayleen Yoder RN ....................  2017   12:57 PM

## 2017-12-28 NOTE — TELEPHONE ENCOUNTER
Patient Information     Patient Name MRN Sex Marisol Ryan [id number] Female 10/5/1930      Telephone Encounter by Jayleen Rosenbaum RN at 10/5/2017  9:52 AM     Author:  Jayleen Rosenbaum RN Service:  (none) Author Type:  NURS- Registered Nurse     Filed:  10/5/2017  9:54 AM Encounter Date:  10/5/2017 Status:  Signed     :  Jayleen Rosenbaum RN (NURS- Registered Nurse)            meclizine (ANTIVERT) 12.5 mg tablet  TAKE 1 TABLET BY MOUTH ONCE DAILY AS NEEDED          Disp: 1 tablet Refills:      Class: eRx Start: 10/2/2017    Documented:5 years ago  Last refill:2017  To pharmacy: [facility]  To be filled at: [facility] [address] [facility] [address]: [phone]     Last visit with BLAINE ASH was on: 2017 in [facility]  PCP:  Blaine Ash MD     Current medication list instructions are take one tablet by mouth every 6 hours as needed back in .     Request is for once daily if needed.     Unable to complete prescription refill per RN Medication Refill Policy.................... JAYLEEN ROSENBAUM RN ....................  10/5/2017   9:35 AM

## 2017-12-28 NOTE — TELEPHONE ENCOUNTER
Patient Information     Patient Name MRN Marisol Howell [id number] Female 10/5/1930      Telephone Encounter by Chelsie Cooper RN at 2017  3:26 PM     Author:  Chelsie Cooper RN Service:  (none) Author Type:  NURS- Registered Nurse     Filed:  2017  3:34 PM Encounter Date:  2017 Status:  Signed     :  Chelsie Cooper RN (NURS- Registered Nurse)            [facility] requested refill of Latanoprost drops. Per medication list Rx for eye drops was filled on 17 with 3 refills given. Too early for refill.      Unable to complete prescription refill per RN Medication Refill Policy.................... Chelsie Cooper RN ....................  2017   3:28 PM

## 2017-12-30 NOTE — NURSING NOTE
Patient Information     Patient Name MRN Sex Marisol Ryan [id number] Female 10/5/1930      Nursing Note by Mraiola Abdalla LPN at 11/10/2017  1:20 PM     Author:  Mariola Abdalla LPN Service:  (none) Author Type:  NURS- Licensed Practical Nurse     Filed:  11/10/2017  1:23 PM Encounter Date:  11/10/2017 Status:  Signed     :  Mariola Abdalla LPN (NURS- Licensed Practical Nurse)            The patient is here today to have a follow up visit.  Mariola Abadlla LPN......11/10/2017  1:16 PM

## 2017-12-30 NOTE — NURSING NOTE
Patient Information     Patient Name MRN Sex Marisol Ryan [id number] Female 10/5/1930      Nursing Note by Mariola Abdalla LPN at 10/25/2017 11:20 AM     Author:  Mariola Abdalla LPN Service:  (none) Author Type:  NURS- Licensed Practical Nurse     Filed:  10/25/2017 11:17 AM Encounter Date:  10/25/2017 Status:  Signed     :  Mariola Abdalla LPN (NURS- Licensed Practical Nurse)            The patient is here today to be seen for a follow up on her kidneys. The patient might also have a UTI.  Mariola Abdalla LPN......10/25/2017  11:11 AM

## 2017-12-30 NOTE — NURSING NOTE
Patient Information     Patient Name MRN Sex Marisol Ryan [id number] Female 10/5/1930      Nursing Note by Mariola Abdalla LPN at 2017 10:20 AM     Author:  Mariola Abdalla LPN Service:  (none) Author Type:  NURS- Licensed Practical Nurse     Filed:  2017 10:27 AM Encounter Date:  2017 Status:  Signed     :  Mariola Abdalla LPN (NURS- Licensed Practical Nurse)            The patient is here today to have a ER follow up.  Mariola Abdalla LPN......2017  10:23 AM

## 2018-01-01 ENCOUNTER — MEDICAL CORRESPONDENCE (OUTPATIENT)
Dept: HEALTH INFORMATION MANAGEMENT | Facility: OTHER | Age: 83
End: 2018-01-01

## 2018-01-01 ENCOUNTER — TELEPHONE (OUTPATIENT)
Dept: INTERNAL MEDICINE | Facility: OTHER | Age: 83
End: 2018-01-01

## 2018-01-01 ENCOUNTER — COMMUNICATION - GICH (OUTPATIENT)
Dept: INTERNAL MEDICINE | Facility: OTHER | Age: 83
End: 2018-01-01

## 2018-01-01 ENCOUNTER — TRANSFERRED RECORDS (OUTPATIENT)
Dept: HEALTH INFORMATION MANAGEMENT | Facility: OTHER | Age: 83
End: 2018-01-01

## 2018-01-01 VITALS
TEMPERATURE: 96.8 F | DIASTOLIC BLOOD PRESSURE: 78 MMHG | BODY MASS INDEX: 34.52 KG/M2 | SYSTOLIC BLOOD PRESSURE: 130 MMHG | OXYGEN SATURATION: 96 % | WEIGHT: 204.25 LBS | HEART RATE: 61 BPM

## 2018-01-01 VITALS
BODY MASS INDEX: 34.57 KG/M2 | DIASTOLIC BLOOD PRESSURE: 80 MMHG | HEIGHT: 65 IN | BODY MASS INDEX: 33.15 KG/M2 | SYSTOLIC BLOOD PRESSURE: 142 MMHG | DIASTOLIC BLOOD PRESSURE: 84 MMHG | DIASTOLIC BLOOD PRESSURE: 80 MMHG | SYSTOLIC BLOOD PRESSURE: 138 MMHG | WEIGHT: 205 LBS | WEIGHT: 196 LBS | HEART RATE: 76 BPM | HEART RATE: 92 BPM | WEIGHT: 199 LBS | HEIGHT: 65 IN | SYSTOLIC BLOOD PRESSURE: 124 MMHG | HEART RATE: 68 BPM | BODY MASS INDEX: 32.65 KG/M2

## 2018-01-01 VITALS
DIASTOLIC BLOOD PRESSURE: 84 MMHG | BODY MASS INDEX: 31.75 KG/M2 | BODY MASS INDEX: 32.99 KG/M2 | SYSTOLIC BLOOD PRESSURE: 120 MMHG | HEIGHT: 65 IN | DIASTOLIC BLOOD PRESSURE: 86 MMHG | HEIGHT: 65 IN | WEIGHT: 190.6 LBS | SYSTOLIC BLOOD PRESSURE: 132 MMHG | HEART RATE: 76 BPM | HEART RATE: 72 BPM | WEIGHT: 198 LBS

## 2018-01-01 VITALS
SYSTOLIC BLOOD PRESSURE: 158 MMHG | TEMPERATURE: 97.1 F | BODY MASS INDEX: 35.47 KG/M2 | WEIGHT: 209.9 LBS | HEART RATE: 76 BPM | DIASTOLIC BLOOD PRESSURE: 88 MMHG

## 2018-01-01 DIAGNOSIS — R30.0 DYSURIA: Primary | ICD-10-CM

## 2018-01-01 DIAGNOSIS — I10 HYPERTENSION: ICD-10-CM

## 2018-01-01 DIAGNOSIS — N39.41 URGE INCONTINENCE: ICD-10-CM

## 2018-01-01 DIAGNOSIS — I10 BENIGN ESSENTIAL HYPERTENSION: ICD-10-CM

## 2018-01-01 DIAGNOSIS — N18.4 CHRONIC KIDNEY DISEASE, STAGE IV (SEVERE) (H): Primary | ICD-10-CM

## 2018-01-01 DIAGNOSIS — D64.9 ANEMIA: ICD-10-CM

## 2018-01-01 DIAGNOSIS — M16.11 PRIMARY OSTEOARTHRITIS OF RIGHT HIP: ICD-10-CM

## 2018-01-01 DIAGNOSIS — I10 ESSENTIAL (PRIMARY) HYPERTENSION: ICD-10-CM

## 2018-01-01 DIAGNOSIS — R30.0 DYSURIA: ICD-10-CM

## 2018-01-01 DIAGNOSIS — K21.9 GASTROESOPHAGEAL REFLUX DISEASE, ESOPHAGITIS PRESENCE NOT SPECIFIED: ICD-10-CM

## 2018-01-01 DIAGNOSIS — F33.1 MODERATE EPISODE OF RECURRENT MAJOR DEPRESSIVE DISORDER (H): ICD-10-CM

## 2018-01-01 DIAGNOSIS — F33.1 MODERATE EPISODE OF RECURRENT MAJOR DEPRESSIVE DISORDER (H): Primary | ICD-10-CM

## 2018-01-01 LAB
ALBUMIN SERPL-MCNC: 3.5 G/DL (ref 3.5–5.7)
ALBUMIN UR-MCNC: 100 MG/DL
ANION GAP SERPL CALCULATED.3IONS-SCNC: 12 MMOL/L (ref 3–14)
APPEARANCE UR: CLEAR
BACTERIA #/AREA URNS HPF: ABNORMAL /HPF
BILIRUB UR QL STRIP: NEGATIVE
BUN SERPL-MCNC: 91 MG/DL (ref 7–25)
CALCIUM SERPL-MCNC: 8.1 MG/DL (ref 8.6–10.3)
CHLORIDE SERPL-SCNC: 104 MMOL/L (ref 98–107)
CO2 SERPL-SCNC: 23 MMOL/L (ref 21–31)
COLOR UR AUTO: YELLOW
CREAT SERPL-MCNC: 4.85 MG/DL (ref 0.6–1.2)
GFR SERPL CREATININE-BSD FRML MDRD: 8 ML/MIN/1.7M2
GLUCOSE SERPL-MCNC: 119 MG/DL (ref 70–105)
GLUCOSE UR STRIP-MCNC: 100 MG/DL
HGB BLD-MCNC: 7.9 G/DL (ref 11.7–15.7)
HGB UR QL STRIP: ABNORMAL
IRON SATN MFR SERPL: 23 % (ref 20–55)
IRON SERPL-MCNC: 63 UG/DL (ref 50–212)
KETONES UR STRIP-MCNC: NEGATIVE MG/DL
LEUKOCYTE ESTERASE UR QL STRIP: NEGATIVE
NITRATE UR QL: NEGATIVE
NON-SQ EPI CELLS #/AREA URNS LPF: ABNORMAL /LPF
PH UR STRIP: 6.5 PH (ref 5–7)
PHOSPHATE SERPL-MCNC: 6.4 MG/DL (ref 2.5–5)
POTASSIUM SERPL-SCNC: 4 MMOL/L (ref 3.5–5.1)
RBC #/AREA URNS AUTO: ABNORMAL /HPF
SODIUM SERPL-SCNC: 139 MMOL/L (ref 134–144)
SOURCE: ABNORMAL
SP GR UR STRIP: 1.02 (ref 1–1.03)
TIBC SERPL-MCNC: 271.6 UG/DL (ref 245–400)
UIBC (UNSATURATED): 208.6 MG/DL
URATE SERPL-MCNC: 5.7 MG/DL (ref 4.4–7.6)
UROBILINOGEN UR STRIP-ACNC: 0.2 EU/DL (ref 0.2–1)
WBC #/AREA URNS AUTO: ABNORMAL /HPF

## 2018-01-01 PROCEDURE — 80069 RENAL FUNCTION PANEL: CPT | Performed by: INTERNAL MEDICINE

## 2018-01-01 PROCEDURE — 83540 ASSAY OF IRON: CPT | Performed by: INTERNAL MEDICINE

## 2018-01-01 PROCEDURE — 81001 URINALYSIS AUTO W/SCOPE: CPT | Performed by: INTERNAL MEDICINE

## 2018-01-01 PROCEDURE — 36415 COLL VENOUS BLD VENIPUNCTURE: CPT | Performed by: INTERNAL MEDICINE

## 2018-01-01 PROCEDURE — 83550 IRON BINDING TEST: CPT | Performed by: INTERNAL MEDICINE

## 2018-01-01 PROCEDURE — 85018 HEMOGLOBIN: CPT | Performed by: INTERNAL MEDICINE

## 2018-01-01 PROCEDURE — 84550 ASSAY OF BLOOD/URIC ACID: CPT | Performed by: INTERNAL MEDICINE

## 2018-01-01 RX ORDER — METOPROLOL SUCCINATE 50 MG/1
50 TABLET, EXTENDED RELEASE ORAL DAILY
Qty: 90 TABLET | Refills: 1 | Status: SHIPPED | OUTPATIENT
Start: 2018-01-01

## 2018-01-01 RX ORDER — OXYBUTYNIN CHLORIDE 10 MG/1
10 TABLET, EXTENDED RELEASE ORAL DAILY
Qty: 90 TABLET | Refills: 3 | Status: SHIPPED | OUTPATIENT
Start: 2018-01-01

## 2018-01-01 ASSESSMENT — PATIENT HEALTH QUESTIONNAIRE - PHQ9
SUM OF ALL RESPONSES TO PHQ QUESTIONS 1-9: 0

## 2018-01-03 NOTE — PROGRESS NOTES
Patient Information     Patient Name MRN Marisol Howell [id number] Female 10/5/1930      Progress Notes by Paul Morrison MD at 2017  9:50 AM     Author:  Paul Morrison MD Service:  (none) Author Type:  Physician     Filed:  2017 10:10 AM Encounter Date:  2017 Status:  Signed     :  Paul Morrison MD (Physician)            SUBJECTIVE:    Marisol Martin is a 86 y.o. female who presents for check up.    HPI Comments: She is here today for checkup. She needs her medications refilled. She is due for lab work. She is up-to-date with immunizations. She complains of feeling tired a lot of the time. She complains of feeling cold all of the time. She is worried that her thyroid might be off and wants to have that checked. She also has some burning with urination on occasion. I told her we could certainly check a urine and see if that is abnormal. Other than that she feels well. Her swelling has been minimal and she has not really needed to take the Lasix. She has not had much for a rash on her legs. Her heart has been fine and her pacemaker evaluations have been normal. She does not need to have that done again until April. She does have some diabetes and has a history of excellent control with that. She is doing well from a mood standpoint, she does have somewhat of a chronic mild dysthymia which is unchanged.      Allergies     Allergen  Reactions     Sulindac Stomach Upset   ,   Current Outpatient Prescriptions     Medication  Sig     acetaminophen 160 mg/5 mL oral liquid Take 15 mL by mouth every 4 hours if needed for Pain. Max acetaminophen dose: 4000mg in 24 hrs.     allopurinol (ZYLOPRIM) 100 mg tablet Take 1 tablet by mouth once daily.     amLODIPine (NORVASC) 5 mg tablet TAKE 1 TABLET BY MOUTH RODRIGUEZ Y     aspirin 81 mg tablet Take 81 mg by mouth once daily with a meal.     Cholecalciferol, Vitamin D3, (VITAMIN D-3) 2,000 unit tablet Take 2,000 Units by mouth once daily.      furosemide (LASIX) 40 mg tablet Take 1 tablet by mouth once daily if needed.     levothyroxine (SYNTHROID) 75 mcg tablet TAKE 1 TABLET BY MOUTH RODRIGUEZ Y     lisinopril (PRINIVIL; ZESTRIL) 20 mg tablet Take 1 tablet by mouth once daily.     meclizine (ANTIVERT) 12.5 mg tablet Take 1 tablet by mouth every 6 hours if needed.     omeprazole (PRILOSEC) 20 mg Delayed-Release capsule TAKE ONE CAPSULE BY MOUTH EVERY DAY BEFORE A MEAL     oxybutynin XL (DITROPAN XL) 10 mg CR tablet TAKE ONE TABLET BY MOUTH EVERY DAY     sertraline (ZOLOFT) 50 mg tablet TAKE ONE TABLET BY MOUTH EVERY DAY     triamcinolone (ARISTOCORT; KENALOG) 0.1 % cream Apply  topically to affected area(s) 2 times daily.     No current facility-administered medications for this visit.      Medications have been reviewed by me and are current to the best of my knowledge and ability. ,   Past Medical History      Diagnosis   Date     Chronic kidney disease (CKD), stage IV (severe) ()  11/28/2014     Depression, major       Diabetes mellitus type II       Fall  2011     With C2 fracture      Gout       H/O chest pain  5/1995     Negative angiogram      H/O echocardiogram  8/2011     Normal left ventricular size and systolic function,EF 60%      History of endoscopy  8/2011     UGI, gastritis and duodenitis      Hypertension       Hypothyroid       Normal stress echocardiogram  2/2007     Dobutamine, negative      Open-angle glaucoma       Pacemaker  4/2008     Dual chamber for heartblock      RA (rheumatoid arthritis) ()       Syncopal episodes       Neg. eval. including EP studies    ,   Patient Active Problem List       Diagnosis  Date Noted     Back pain  05/22/2015     S/P cardiac pacemaker  03/04/2015     SSS (sick sinus syndrome) ()  03/04/2015     Cognitive decline  11/28/2014     Chronic kidney disease (CKD), stage IV (severe) ()  11/28/2014     ACP (advance care planning)  03/17/2014     DIZZINESS  01/19/2012     ANEMIA  12/15/2010      ARTHRITIS, RHEUMATOID  2010     DEPRESSION, MAJOR, RECURRENT, MODERATE  2010     HYPOTHYROIDISM       DEGENERATIVE JOINT DISEASE       HYPERTENSION       DIASTOLIC DYSFUNCTION       OBESITY       Diabetes mellitus without complication (HC)       diet controlled          GOUT       ATRIOVENTRICULAR BLOCK, 3RD DEGREE  11/15/2008     Pacemaker placed        ,   Past Surgical History       Procedure   Laterality Date     Colonoscopy screening   2002     Normal       Knee replacement        Left       Hernia repair        Right       Shoulder replacement        Bilateral       Thyroidectomy   3/23/2006     Total, secondary to goiter       Knee replacement   2007     Right       Lap cholecystectomy        For chronic cholelithiasis and cholecystitis       Colonoscopy screening        Negative       Nephrectomy   2010     Left,  for benign mass       Esophagogastroduodenoscopy        Acute duodenitis,reactive gastropathy      and   Social History     Substance Use Topics       Smoking status: Never Smoker     Smokeless tobacco: Never Used     Alcohol use No     Family Status     Relation  Status     Mother     End stage aortic stenosis      Father  at age 80    Unknown reasons      Sister     Leukemia      Brother     DM, kidney failure      Daughter Alive     Neg.      Son      Other      Social History     Social History        Marital status:       Spouse name: N/A     Number of children:  N/A     Years of education:  N/A     Social History Main Topics       Smoking status: Never Smoker     Smokeless tobacco: Never Used     Alcohol use No     Drug use: No     Sexual activity: Not Asked     Other Topics  Concern     None      Social History Narrative      in .      Lives near [place].     Is brought to the clinic by her daughter.       REVIEW OF SYSTEMS:  Review of Systems   All other systems reviewed and are negative.      OBJECTIVE:  BP  "124/80  Pulse 68  Ht 1.638 m (5' 4.5\")  Wt 90.3 kg (199 lb)  Breastfeeding? No  BMI 33.63 kg/m2    EXAM:   Physical Exam   Constitutional: She is well-developed, well-nourished, and in no distress. No distress.   HENT:   Head: Normocephalic.   Neck: Normal range of motion. Neck supple.   Cardiovascular: Normal rate and regular rhythm.    Pulmonary/Chest: Effort normal and breath sounds normal. No respiratory distress. She has no wheezes. She has no rales.   Abdominal: Soft. Bowel sounds are normal. She exhibits no distension. There is no tenderness.   Musculoskeletal: She exhibits no edema.   Neurological: She is alert.   Skin: Skin is warm and dry. She is not diaphoretic.   Psychiatric: Affect normal.   Nursing note and vitals reviewed.      ASSESSMENT/PLAN:    ICD-10-CM    1. Hypothyroidism, unspecified type E03.9 TSH      T4,FREE      TSH      T4,FREE   2. HYPERTENSION I10 amLODIPine (NORVASC) 5 mg tablet      levothyroxine (SYNTHROID) 75 mcg tablet      lisinopril (PRINIVIL; ZESTRIL) 20 mg tablet      BASIC METABOLIC PANEL      BASIC METABOLIC PANEL   3. Diabetes mellitus without complication (HC) E11.9 HEMOGLOBIN A1C MONITORING (POCT)      HEMOGLOBIN A1C MONITORING (POCT)   4. DIASTOLIC DYSFUNCTION I51.9    5. DEPRESSION, MAJOR, RECURRENT, MODERATE F33.1 sertraline (ZOLOFT) 50 mg tablet   6. Chronic kidney disease (CKD), stage IV (severe) (HC) N18.4 HEMOGLOBIN      HEMOGLOBIN   7. Dysuria R30.0 URINALYSIS W REFLEX MICROSCOPIC IF POSITIVE   8. GOUT M10.9 allopurinol (ZYLOPRIM) 100 mg tablet   9. Gastroesophageal reflux disease, esophagitis presence not specified K21.9 omeprazole (PRILOSEC) 20 mg Delayed-Release capsule   10. Urge incontinence N39.41 oxybutynin XL (DITROPAN XL) 10 mg CR tablet        Plan:  She appears to be doing fairly well at this time for her age and for where she has been in the past. Medications are refilled without any change. Complete lab drawn and pending, I will send her a letter with " the results with any recommendation for change as well as recommendations for when she needs to follow-up. Obviously she will follow-up anytime as needed.

## 2018-01-03 NOTE — NURSING NOTE
Patient Information     Patient Name MRN Marisol Howell [id number] Female 10/5/1930      Nursing Note by Mariola Abdalla LPN at 2017  9:50 AM     Author:  Mariola Abdalla LPN Service:  (none) Author Type:  NURS- Licensed Practical Nurse     Filed:  2017  9:54 AM Encounter Date:  2017 Status:  Signed     :  Mariola Abdalla LPN (NURS- Licensed Practical Nurse)            The patient is here today to get a refill on her medications and have some lab work done.  Mariola Abdalla LPN......2017  9:40 AM

## 2018-01-03 NOTE — PROGRESS NOTES
Patient Information     Patient Name MRN Sex Marisol Ryan [id number] Female 10/5/1930      Progress Notes by Paul Morrison MD at 3/15/2017  9:30 AM     Author:  Paul Morrison MD Service:  (none) Author Type:  Physician     Filed:  3/15/2017  9:41 AM Encounter Date:  3/15/2017 Status:  Signed     :  Paul Morrison MD (Physician)            SUBJECTIVE:    Marisol Martin is a 86 y.o. female who presents for ED an OV follow up.    HPI Comments: She is here today for follow-up. I saw her in January at which time she was a little bit more anemic and had a little bit more renal insufficiency. I wanted her to come back today for recheck. In the interim, she had another syncopal episode. She was evaluated in the emergency room and no etiology was found. Her pacemaker function was acceptable. Her lab was basically unchanged. She has had a history of syncopal episodes, etiology unknown and unclear. She is resistant to moving into an assisted living or other type facility. No medication changes were made. She does have some peripheral edema and I spent some time today reviewing with her the fact that she needs to follow a low-sodium diet. She does not add salt to her foods but she does not read the labels. I encouraged her to do that. She has no other complaints or concerns at this time.      Allergies     Allergen  Reactions     Sulindac Stomach Upset   ,   Current Outpatient Prescriptions     Medication  Sig     acetaminophen 160 mg/5 mL oral liquid Take 15 mL by mouth every 4 hours if needed for Pain. Max acetaminophen dose: 4000mg in 24 hrs.     allopurinol (ZYLOPRIM) 100 mg tablet Take 1 tablet by mouth once daily.     amLODIPine (NORVASC) 5 mg tablet TAKE 1 TABLET BY MOUTH RODRIGUEZ Y     aspirin 81 mg tablet Take 81 mg by mouth once daily with a meal.     Cholecalciferol, Vitamin D3, (VITAMIN D-3) 2,000 unit tablet Take 2,000 Units by mouth once daily.     furosemide (LASIX) 40 mg tablet Take 1 tablet  by mouth once daily if needed.     levothyroxine (SYNTHROID) 75 mcg tablet TAKE 1 TABLET BY MOUTH RODRIGUEZ Y     lisinopril (PRINIVIL; ZESTRIL) 20 mg tablet Take 1 tablet by mouth once daily.     meclizine (ANTIVERT) 12.5 mg tablet Take 1 tablet by mouth every 6 hours if needed.     omeprazole (PRILOSEC) 20 mg Delayed-Release capsule Take 1 capsule by mouth once daily before a meal.     oxybutynin XL (DITROPAN XL) 10 mg CR tablet Take 1 tablet by mouth once daily.     sertraline (ZOLOFT) 50 mg tablet Take 1 tablet by mouth once daily.     triamcinolone (ARISTOCORT; KENALOG) 0.1 % cream Apply  topically to affected area(s) 2 times daily.     No current facility-administered medications for this visit.      Medications have been reviewed by me and are current to the best of my knowledge and ability. ,   Past Medical History      Diagnosis   Date     Chronic kidney disease (CKD), stage IV (severe) ()  11/28/2014     Depression, major       Diabetes mellitus type II       Fall  2011     With C2 fracture      Gout       H/O chest pain  5/1995     Negative angiogram      H/O echocardiogram  8/2011     Normal left ventricular size and systolic function,EF 60%      History of endoscopy  8/2011     UGI, gastritis and duodenitis      Hypertension       Hypothyroid       Normal stress echocardiogram  2/2007     Dobutamine, negative      Open-angle glaucoma       Pacemaker  4/2008     Dual chamber for heartblock      RA (rheumatoid arthritis) ()       Syncopal episodes       Neg. eval. including EP studies    ,   Patient Active Problem List       Diagnosis  Date Noted     Syncope  03/15/2017     Back pain  05/22/2015     S/P cardiac pacemaker  03/04/2015     SSS (sick sinus syndrome) ()  03/04/2015     Cognitive decline  11/28/2014     Chronic kidney disease (CKD), stage IV (severe) ()  11/28/2014     ACP (advance care planning)  03/17/2014     DIZZINESS  01/19/2012     ANEMIA  12/15/2010     ARTHRITIS, RHEUMATOID   09/08/2010     DEPRESSION, MAJOR, RECURRENT, MODERATE  01/06/2010     HYPOTHYROIDISM       DEGENERATIVE JOINT DISEASE       HYPERTENSION       DIASTOLIC DYSFUNCTION       OBESITY       Diabetes mellitus without complication (HC)       diet controlled          GOUT       ATRIOVENTRICULAR BLOCK, 3RD DEGREE  11/15/2008     Pacemaker placed         and   Past Surgical History       Procedure   Laterality Date     Colonoscopy screening   8/9/2002     Normal       Knee replacement        Left       Hernia repair        Right       Shoulder replacement        Bilateral       Thyroidectomy   3/23/2006     Total, secondary to goiter       Knee replacement   2/2007     Right       Lap cholecystectomy   2007     For chronic cholelithiasis and cholecystitis       Colonoscopy screening   2008     Negative       Nephrectomy   2010     Left,  for benign mass       Esophagogastroduodenoscopy        Acute duodenitis,reactive gastropathy         REVIEW OF SYSTEMS:  Review of Systems   All other systems reviewed and are negative.      OBJECTIVE:  /80  Pulse 76  Wt 93 kg (205 lb)  Breastfeeding? No  BMI 34.11 kg/m2    EXAM:   Physical Exam   Constitutional: She is well-developed, well-nourished, and in no distress. No distress.   Cardiovascular: Normal rate and regular rhythm.   Occasional extrasystoles are present.   Murmur heard.   Systolic murmur is present with a grade of 3/6   Pulmonary/Chest: Effort normal and breath sounds normal. No respiratory distress. She has no wheezes. She has no rales.   Musculoskeletal: She exhibits edema.   2= right, 1+ left   Skin: She is not diaphoretic.   Nursing note and vitals reviewed.      ASSESSMENT/PLAN:    ICD-10-CM    1. Chronic kidney disease (CKD), stage IV (severe) (HC) N18.4 HEMOGLOBIN      BASIC METABOLIC PANEL      FERRITIN      IRON   2. Rash R21 betamethasone valerate (VALISONE) 0.1 % cream   3. ATRIOVENTRICULAR BLOCK, 3RD DEGREE I44.2    4. Syncope, unspecified syncope type R55          Plan:  She is resistant to moving out of her home. She does have a first alert button so that is reassuring. I reviewed with her to make sure that she had adequate lighting at night so that she does not fall. She does have a walker. I encouraged her to make sure the house is otherwise safe without any loose rugs, cords, etc. Medications will remain the same. Lab is pending including some iron studies, I will send a letter with the result and any recommendations. She will continue with routine pacemaker checks. Follow-up will be dependent on the results of her lab as well as how she does clinically.

## 2018-01-03 NOTE — NURSING NOTE
Patient Information     Patient Name MRN Sex Marisol Ryan [id number] Female 10/5/1930      Nursing Note by Mariola Abdalla LPN at 3/15/2017  9:30 AM     Author:  Mariola Abdalla LPN Service:  (none) Author Type:  NURS- Licensed Practical Nurse     Filed:  3/15/2017  9:26 AM Encounter Date:  3/15/2017 Status:  Signed     :  Mariola Abdalla LPN (NURS- Licensed Practical Nurse)            The patient is here today to have a follow up on some lab work.  Mariola Abdalla LPN......3/15/2017  9:21 AM

## 2018-02-12 NOTE — TELEPHONE ENCOUNTER
Patient Information     Patient Name MRN Sex Marisol Ryan [id number] Female 10/5/1930      Telephone Encounter by Chelsie Phan RN at 2017  1:39 PM     Author:  Chelsie Phan RN Service:  (none) Author Type:  NURS- Registered Nurse     Filed:  2017  1:40 PM Encounter Date:  2017 Status:  Signed     :  Chelsie Phan RN (NURS- Registered Nurse)            Steroid Topical Creams    Office visit in the past 12 months.    Last visit with BLAINE ASH was on: 2017 in [facility]  Next visit with BLAINE ASH is on: No future appointment listed with this provider  Next visit with Internal Medicine is on: No future appointment listed in this department    Max refill for one year only for documented problems such as psoriasis and eczema.    Prescription refilled per RN Medication Refill Policy.................... Chelsie Phan RN ....................  2017   1:39 PM

## 2018-02-12 NOTE — PROGRESS NOTES
Patient Information     Patient Name MRN Sex Marisol Ryan [id number] Female 10/5/1930      Progress Notes by Theresa Bolden NP at 2017 11:00 AM     Author:  Theresa Bolden NP Service:  (none) Author Type:  PHYS- Nurse Practitioner     Filed:  2017  1:58 PM Encounter Date:  2017 Status:  Signed     :  Theresa Bolden NP (PHYS- Nurse Practitioner)            SUBJECTIVE:    Marisol Martin is a 87 y.o. female who presents with a family member from [facility] for redness edema and pain right lower leg, minimal history regarding onset or any history of falls. Patient denies any history of falls in the past month. Walks with a walker, fatigued and usually requires standby assistance. Saw Dr. Morrison couple weeks ago regarding her worsening kidney disease, anemia and edema. Reports has never had gout attack, has been on allopurinol for a while.  Denies a fever, nursing home reports vital signs have been stable.    Had pacemaker interrogation recently and has a follow-up with Dr. Morrison next Wednesday. Patient reports has been taking iron as Dr. Morrison recommended for her anemia.  Weight today 204 pounds, clinic documented weight November 10,  196 pounds, and looking back appears her baseline 184-185 pounds however mobility is limited, reports eating meals at least 3 times a day. Denies any difficulty breathing however reports a chronic cough for years.        HPI    Allergies     Allergen  Reactions     Sulindac Stomach Upset   ,   Family History       Problem   Relation Age of Onset     Blood Disease  Neg.      no bleeding or clotting       Cancer  Son      in the jaw       Heart Disease  Son      MI with jaw surgery       Diabetes  Other      many     ,   Current Outpatient Prescriptions on File Prior to Visit       Medication  Sig Dispense Refill     acetaminophen (TYLENOL) 325 mg tablet 1 at bedtime 100 tablet 3     allopurinol (ZYLOPRIM) 100 mg tablet TAKE 1 TABLET BY  MOUTH ONCE DAILY 131 tablet 3     amLODIPine (NORVASC) 5 mg tablet TAKE 1 TABLET BY MOUTH RODRIGUEZ Y 90 tablet 3     Cholecalciferol, Vitamin D3, (VITAMIN D-3) 2,000 unit tablet Take 1 tablet by mouth once daily. 90 tablet 1     DME Belted Assurance pads 120 Each prn     ferrous sulfate, 65 mg elemental, (FEOSOL) tablet Take 1 tablet by mouth once daily with a meal. 100 tablet 0     levothyroxine (SYNTHROID) 75 mcg tablet TAKE 1 TABLET BY MOUTH EVERY MORNING 270 tablet 0     meclizine (ANTIVERT) 12.5 mg tablet Take 0.5 tablets by mouth every 6 hours if needed. 50 tablet 3     metoprolol succinate (TOPROL XL) 50 mg sustained-release tablet Take 1 tablet by mouth once daily. 30 tablet 3     omeprazole (PRILOSEC) 20 mg Delayed-Release capsule TAKE 1 CAPSULE BY MOUTH EVERY DAY WITH A MEAL (AM) 90 capsule 1     oxybutynin XL (DITROPAN XL) 10 mg CR tablet TAKE 1 TABLET BY MOUTH EVERY DAY (AM) 90 tablet 1     sertraline (ZOLOFT) 50 mg tablet TAKE 1 TABLET BY MOUTH EVERY MORNING 90 tablet 1     triamcinolone (ARISTOCORT; KENALOG) 0.1 % cream Apply  topically to affected area(s) 2 times daily. 45 g 2     No current facility-administered medications on file prior to visit.    ,   Current Outpatient Prescriptions:      acetaminophen (TYLENOL) 325 mg tablet, 1 at bedtime, Disp: 100 tablet, Rfl: 3     allopurinol (ZYLOPRIM) 100 mg tablet, TAKE 1 TABLET BY MOUTH ONCE DAILY, Disp: 131 tablet, Rfl: 3     amLODIPine (NORVASC) 5 mg tablet, TAKE 1 TABLET BY MOUTH RODRIGUEZ Y, Disp: 90 tablet, Rfl: 3     Cholecalciferol, Vitamin D3, (VITAMIN D-3) 2,000 unit tablet, Take 1 tablet by mouth once daily., Disp: 90 tablet, Rfl: 1     DME, Belted Assurance pads, Disp: 120 Each, Rfl: prn     ferrous sulfate, 65 mg elemental, (FEOSOL) tablet, Take 1 tablet by mouth once daily with a meal., Disp: 100 tablet, Rfl: 0     FLUCELVAX QUAD [id number], PF, 60 mcg (15 mcg x 4)/0.5 mL injection, USE AS DIRECTED, Disp: , Rfl: 0     latanoprost (XALATAN) 0.005 %  ophthalmic solution, INSTILL 1 DROP IN EACH EYE NIGHTLY AT BEDTIME, Disp: , Rfl: 3     levothyroxine (SYNTHROID) 75 mcg tablet, TAKE 1 TABLET BY MOUTH EVERY MORNING, Disp: 270 tablet, Rfl: 0     meclizine (ANTIVERT) 12.5 mg tablet, Take 0.5 tablets by mouth every 6 hours if needed., Disp: 50 tablet, Rfl: 3     metoprolol succinate (TOPROL XL) 50 mg sustained-release tablet, Take 1 tablet by mouth once daily., Disp: 30 tablet, Rfl: 3     omeprazole (PRILOSEC) 20 mg Delayed-Release capsule, TAKE 1 CAPSULE BY MOUTH EVERY DAY WITH A MEAL (AM), Disp: 90 capsule, Rfl: 1     oxybutynin XL (DITROPAN XL) 10 mg CR tablet, TAKE 1 TABLET BY MOUTH EVERY DAY (AM), Disp: 90 tablet, Rfl: 1     sertraline (ZOLOFT) 50 mg tablet, TAKE 1 TABLET BY MOUTH EVERY MORNING, Disp: 90 tablet, Rfl: 1     triamcinolone (ARISTOCORT; KENALOG) 0.1 % cream, Apply  topically to affected area(s) 2 times daily., Disp: 45 g, Rfl: 2     trimethoprim-sulfamethoxazole, 160-800 mg, (BACTRIM DS, SEPTRA DS) tablet, Take 1 tablet by mouth 2 times daily., Disp: 20 tablet, Rfl: 0  Medications have been reviewed by me and are current to the best of my knowledge and ability.,       Past Medical History:     Diagnosis  Date     Chronic kidney disease (CKD), stage IV (severe) (HC) 11/28/2014     Depression, major      Diabetes mellitus type II      Fall 2011    With C2 fracture      Gout      H/O chest pain 5/1995    Negative angiogram      H/O echocardiogram 8/2011    Normal left ventricular size and systolic function,EF 60%      History of endoscopy 8/2011    UGI, gastritis and duodenitis      Hypertension      Hypothyroid      Normal stress echocardiogram 2/2007    Dobutamine, negative      Open-angle glaucoma      Pacemaker 4/2008    Dual chamber for heartblock      RA (rheumatoid arthritis) (HC)      Syncopal episodes     Neg. eval. including EP studies    ,   Patient Active Problem List       Diagnosis  Date Noted     Anemia of chronic disease  12/04/2017      Urinary incontinence  08/09/2017     Primary osteoarthritis of right hip  08/09/2017     Syncope  03/15/2017     Back pain  05/22/2015     S/P cardiac pacemaker  03/04/2015     SSS (sick sinus syndrome) (HC)  03/04/2015     Cognitive decline  11/28/2014     Chronic kidney disease (CKD), stage IV (severe) (HC)  11/28/2014     ACP (advance care planning)  03/17/2014     DIZZINESS  01/19/2012     ANEMIA  12/15/2010     ARTHRITIS, RHEUMATOID  09/08/2010     DEPRESSION, MAJOR, RECURRENT, MODERATE  01/06/2010     HYPOTHYROIDISM       DEGENERATIVE JOINT DISEASE       HYPERTENSION       DIASTOLIC DYSFUNCTION       OBESITY       Diabetes mellitus without complication (HC)       diet controlled          GOUT       ATRIOVENTRICULAR BLOCK, 3RD DEGREE  11/15/2008     Pacemaker placed        ,   Past Surgical History:      Procedure  Laterality Date     COLONOSCOPY SCREENING  8/9/2002    Normal       COLONOSCOPY SCREENING  2008    Negative       ESOPHAGOGASTRODUODENOSCOPY      Acute duodenitis,reactive gastropathy       HERNIA REPAIR      Right       KNEE REPLACEMENT      Left       KNEE REPLACEMENT  2/2007    Right       LAP CHOLECYSTECTOMY  2007    For chronic cholelithiasis and cholecystitis       NEPHRECTOMY  2010    Left,  for benign mass       SHOULDER REPLACEMENT      Bilateral       THYROIDECTOMY  3/23/2006    Total, secondary to goiter      and   Social History     Substance Use Topics       Smoking status: Never Smoker     Smokeless tobacco: Never Used     Alcohol use No       REVIEW OF SYSTEMS:  Review of Systems   Constitutional: Positive for malaise/fatigue.   HENT: Negative.    Eyes: Negative.    Respiratory: Positive for cough.    Cardiovascular: Negative.    Gastrointestinal: Negative.    Genitourinary: Negative.    Musculoskeletal: Positive for myalgias.   Skin: Positive for rash.   Neurological: Negative.    Endo/Heme/Allergies: Negative.    Psychiatric/Behavioral: Negative.        OBJECTIVE:  /78  Pulse  61  Temp 96.8  F (36  C) (Tympanic)  Wt 92.6 kg (204 lb 4 oz)  SpO2 96%  BMI 34.52 kg/m2    EXAM:   Physical Exam   Constitutional: She is oriented to person, place, and time and well-developed, well-nourished, and in no distress.   HENT:   Head: Normocephalic and atraumatic.   Cardiovascular: Normal rate and regular rhythm.    Pulmonary/Chest: Effort normal and breath sounds normal.   Musculoskeletal: She exhibits edema and tenderness.   Right lower extremity scattered erythemic macular rash area diffuse mostly covering anterior shin, no erythema dorsum of foot or great toe    Right lower extremity appears about twice the size as the left lower extremity, diffuse edema at least 3+  pockets of subdermal edema no open or pustular areas, no drainage areas    Leg and both feet are warm--- able to move right toes  barely able to feel palpable pulse right DP due to edema     Neurological: She is alert and oriented to person, place, and time.   Uses walker--needs standby assistance   Skin: Skin is warm and dry. Rash noted. There is erythema.   Psychiatric: Mood, memory, affect and judgment normal.   Nursing note and vitals reviewed.      ASSESSMENT/PLAN:    ICD-10-CM    1. Leg edema, right R60.0 BASIC METABOLIC PANEL      CBC WITH DIFFERENTIAL      URIC ACID      D-DIMER      BASIC METABOLIC PANEL      CBC WITH DIFFERENTIAL      URIC ACID      D-DIMER      CBC WITH AUTO DIFFERENTIAL   2. Chronic kidney disease (CKD), stage IV (severe) () N18.4 BASIC METABOLIC PANEL      CBC WITH DIFFERENTIAL      URIC ACID      D-DIMER      BASIC METABOLIC PANEL      CBC WITH DIFFERENTIAL      URIC ACID      D-DIMER      CBC WITH AUTO DIFFERENTIAL   3. Right calf pain M79.661    4. Positive D dimer R79.89    5. DIASTOLIC DYSFUNCTION I51.9    6. S/P cardiac pacemaker Z95.0    7. Anemia of chronic disease D63.8       Results for orders placed or performed in visit on 12/02/17      BASIC METABOLIC PANEL      Result  Value Ref Range     SODIUM 138 133 - 143 mmol/L    POTASSIUM 4.6 3.5 - 5.1 mmol/L    CHLORIDE 111 (H) 98 - 107 mmol/L    CO2,TOTAL 17 (L) 21 - 31 mmol/L    ANION GAP 10 5 - 18                    GLUCOSE 87 70 - 105 mg/dL    CALCIUM 7.8 (L) 8.6 - 10.3 mg/dL    BUN 80 (H) 7 - 25 mg/dL    CREATININE 4.06 (H) 0.70 - 1.30 mg/dL    BUN/CREAT RATIO           20                    GFR if African American 13 (L) >60 ml/min/1.73m2    GFR if not African American 10 (L) >60 ml/min/1.73m2   URIC ACID      Result  Value Ref Range    URIC ACID 5.1 4.4 - 7.6 mg/dL   D-DIMER      Result  Value Ref Range    D-DIMER, QUANTITATIVE  917 (H) >199 - 230 ng/mL   CBC WITH AUTO DIFFERENTIAL      Result  Value Ref Range    WHITE BLOOD COUNT         8.5 4.5 - 11.0 thou/cu mm    RED BLOOD COUNT           2.44 (L) 4.00 - 5.20 mil/cu mm    HEMOGLOBIN                7.8 (L) 12.0 - 16.0 g/dL    HEMATOCRIT                23.3 (L) 33.0 - 51.0 %    MCV                       96 80 - 100 fL    MCH                       32.0 26.0 - 34.0 pg    MCHC                      33.5 32.0 - 36.0 g/dL    RDW                       15.1 11.5 - 15.5 %    PLATELET COUNT            214 140 - 440 thou/cu mm    MPV                       9.1 6.5 - 11.0 fL    NEUTROPHILS               60.9 42.0 - 72.0 %    LYMPHOCYTES               27.1 20.0 - 44.0 %    MONOCYTES                 8.0 <12.0 %    EOSINOPHILS               3.2 <8.0 %    BASOPHILS                 0.4 <3.0 %    IMMATURE GRANULOCYTES(METAS,MYELOS,PROS) 0.4 %    ABSOLUTE NEUTROPHILS      5.2 1.7 - 7.0 thou/cu mm    ABSOLUTE LYMPHOCYTES      2.3 0.9 - 2.9 thou/cu mm    ABSOLUTE MONOCYTES        0.7 <0.9 thou/cu mm    ABSOLUTE EOSINOPHILS      0.3 <0.5 thou/cu mm    ABSOLUTE BASOPHILS        0.0 <0.3 thou/cu mm    ABSOLUTE IMMATURE GRANULOCYTES(METAS,MYELOS,PROS) 0.0 <=0.3 thou/cu mm     probable cellulitis however with high d-dimer, severe kidney disease and immobility at risk for DVT  along with concerns regarding weight gain, heart  failure and severe kidney disease      Plan:  Called ED Dr Swann to discuss clinical findings and agrees to transport to ED for ultrasound and any other further investigations indicated    Patient was transported by wheelchair safely to ED

## 2018-02-12 NOTE — NURSING NOTE
Patient Information     Patient Name MRN Marisol Howell [id number] Female 10/5/1930      Nursing Note by Mariola Abdalla LPN at 2017 10:00 AM     Author:  Mariola Abdalla LPN Service:  (none) Author Type:  NURS- Licensed Practical Nurse     Filed:  2017 10:05 AM Encounter Date:  2017 Status:  Signed     :  Mariola Abdalla LPN (NURS- Licensed Practical Nurse)            The patient is here today to have a three week follow up.  Mariola Abdalla LPN......2017  9:57 AM

## 2018-02-12 NOTE — NURSING NOTE
Patient Information     Patient Name MRN Marisol Howell [id number] Female 10/5/1930      Nursing Note by Trisha Stokes at 2017 11:00 AM     Author:  Trisha Stokes Service:  (none) Author Type:  (none)     Filed:  2017 11:18 AM Encounter Date:  2017 Status:  Signed     :  Trisha Stokes            Patient presents to the clinic for right leg swelling and rash that has been going on and off for awhile. She also has right big toe pain x 2 days. Patient reports the toe pain waking her up at night. She has taken prescribed ointment for the rash but says the nurse would not put it on today.  Trisha OLIVO, JUNI.......2017..11:00 AM

## 2018-02-12 NOTE — PROGRESS NOTES
Patient Information     Patient Name MRN Sex Marisol Ryan [id number] Female 10/5/1930      Progress Notes by Paul Morrison MD at 2017 10:00 AM     Author:  Paul Morrison MD Service:  (none) Author Type:  Physician     Filed:  2017 11:18 AM Encounter Date:  2017 Status:  Signed     :  Paul Morrison MD (Physician)            SUBJECTIVE:    Marisol Martin is a 87 y.o. female who presents for follow-up on her medical problems.    HPI Comments: She is here today for follow-up. I want her to follow-up on her end-stage kidney disease. As it turns out, she was recently seen in the emergency room with cellulitis of her right leg. She's been on sulfa twice daily. She does not tolerate this very well, it causes her to be quite nauseated. She did have lab work done with that visit so we don't need to repeat it today. Her hemoglobin is 7.8 and her creatinine is up over 4. Long discussion with the patient today regarding her current situation. Daughter is in attendance as well.      Allergies     Allergen  Reactions     Sulindac Stomach Upset   ,   Current Outpatient Prescriptions     Medication  Sig     acetaminophen (TYLENOL) 325 mg tablet 1 at bedtime     allopurinol (ZYLOPRIM) 100 mg tablet TAKE 1 TABLET BY MOUTH ONCE DAILY     amLODIPine (NORVASC) 5 mg tablet TAKE 1 TABLET BY MOUTH RODRIGUEZ Y     Cholecalciferol, Vitamin D3, (VITAMIN D-3) 2,000 unit tablet Take 1 tablet by mouth once daily.     DME Belted Assurance pads     ferrous sulfate, 65 mg elemental, (FEOSOL) tablet Take 1 tablet by mouth once daily with a meal.     FLUCELVAX QUAD [id number], PF, 60 mcg (15 mcg x 4)/0.5 mL injection USE AS DIRECTED     latanoprost (XALATAN) 0.005 % ophthalmic solution INSTILL 1 DROP IN EACH EYE NIGHTLY AT BEDTIME     levothyroxine (SYNTHROID) 75 mcg tablet TAKE 1 TABLET BY MOUTH EVERY MORNING     meclizine (ANTIVERT) 12.5 mg tablet Take 0.5 tablets by mouth every 6 hours if needed.     metoprolol  succinate (TOPROL XL) 50 mg sustained-release tablet Take 1 tablet by mouth once daily.     omeprazole (PRILOSEC) 20 mg Delayed-Release capsule TAKE 1 CAPSULE BY MOUTH EVERY DAY WITH A MEAL (AM)     oxybutynin XL (DITROPAN XL) 10 mg CR tablet TAKE 1 TABLET BY MOUTH EVERY DAY (AM)     sertraline (ZOLOFT) 50 mg tablet TAKE 1 TABLET BY MOUTH EVERY MORNING     triamcinolone (ARISTOCORT; KENALOG) 0.1 % cream Apply  topically to affected area(s) 2 times daily.     trimethoprim-sulfamethoxazole, 160-800 mg, (BACTRIM DS, SEPTRA DS) tablet Take 1 tablet by mouth 2 times daily.     No current facility-administered medications for this visit.      Medications have been reviewed by me and are current to the best of my knowledge and ability. ,   Past Medical History:     Diagnosis  Date     Chronic kidney disease (CKD), stage IV (severe) () 11/28/2014     Depression, major      Diabetes mellitus type II      Fall 2011    With C2 fracture      Gout      H/O chest pain 5/1995    Negative angiogram      H/O echocardiogram 8/2011    Normal left ventricular size and systolic function,EF 60%      History of endoscopy 8/2011    UGI, gastritis and duodenitis      Hypertension      Hypothyroid      Normal stress echocardiogram 2/2007    Dobutamine, negative      Open-angle glaucoma      Pacemaker 4/2008    Dual chamber for heartblock      RA (rheumatoid arthritis) ()      Syncopal episodes     Neg. eval. including EP studies    ,   Patient Active Problem List       Diagnosis  Date Noted     Anemia of chronic disease  12/04/2017     Urinary incontinence  08/09/2017     Primary osteoarthritis of right hip  08/09/2017     Syncope  03/15/2017     Back pain  05/22/2015     S/P cardiac pacemaker  03/04/2015     SSS (sick sinus syndrome) ()  03/04/2015     Cognitive decline  11/28/2014     Chronic kidney disease (CKD), stage IV (severe) ()  11/28/2014     ACP (advance care planning)  03/17/2014     DIZZINESS  01/19/2012     ANEMIA   12/15/2010     ARTHRITIS, RHEUMATOID  09/08/2010     DEPRESSION, MAJOR, RECURRENT, MODERATE  01/06/2010     HYPOTHYROIDISM       DEGENERATIVE JOINT DISEASE       HYPERTENSION       DIASTOLIC DYSFUNCTION       OBESITY       Diabetes mellitus without complication (HC)       diet controlled          GOUT       ATRIOVENTRICULAR BLOCK, 3RD DEGREE  11/15/2008     Pacemaker placed        ,   Past Surgical History:      Procedure  Laterality Date     COLONOSCOPY SCREENING  8/9/2002    Normal       COLONOSCOPY SCREENING  2008    Negative       ESOPHAGOGASTRODUODENOSCOPY      Acute duodenitis,reactive gastropathy       HERNIA REPAIR      Right       KNEE REPLACEMENT      Left       KNEE REPLACEMENT  2/2007    Right       LAP CHOLECYSTECTOMY  2007    For chronic cholelithiasis and cholecystitis       NEPHRECTOMY  2010    Left,  for benign mass       SHOULDER REPLACEMENT      Bilateral       THYROIDECTOMY  3/23/2006    Total, secondary to goiter      and   Social History     Substance Use Topics       Smoking status: Never Smoker     Smokeless tobacco: Never Used     Alcohol use No       REVIEW OF SYSTEMS:  Review of Systems   All other systems reviewed and are negative.      OBJECTIVE:  /88  Pulse 76  Temp 97.1  F (36.2  C) (Tympanic)  Wt 95.2 kg (209 lb 14.4 oz)  Breastfeeding? No  BMI 37.18 kg/m2    EXAM:   Physical Exam   Constitutional:   Chronically ill, in wheelchair   Cardiovascular: Regular rhythm.   Occasional extrasystoles are present.   Pulmonary/Chest: She has decreased breath sounds. She has no wheezes. She has rhonchi in the left middle field. She has no rales.   Skin:   There is some erythema of her right lower extremity. There is pen markings showing where the cellulitis was initially present. The erythema is mild and appears to be retreating based upon the previous markings.   Nursing note and vitals reviewed.      ASSESSMENT/PLAN:    ICD-10-CM    1. Cellulitis of leg, right L03.115    2. Chronic  kidney disease (CKD), stage IV (severe) (HC) N18.4 XR CHEST 2 VIEWS PA AND LATERAL      AMB CONSULT TO NEPHROLOGY   3. Anemia, unspecified type D64.9    4. HYPERTENSION I10         Plan:  I think her cellulitis is better. A chest x-ray today showed maybe a bit of vascular congestion but no other abnormalities. I think most of her problems are now related to her end-stage kidney disease. We had a long discussion today regarding whether she would want to proceed with dialysis, she has been equivocal in the past. I recommended at least a consultation with nephrology to discuss further and she is willing to do that. We will stop the sulfa after 7 days because of the side effects that she is having. No other medication changes. She will follow-up with me as needed. Over 50% of a 25 minute visit spent in counseling and coordination of care.

## 2018-02-13 NOTE — TELEPHONE ENCOUNTER
Patient Information     Patient Name MRN Sex Marisol Ryan [id number] Female 10/5/1930      Telephone Encounter by Kristina Lamb RN at 2018  2:43 PM     Author:  Kristina Lamb RN Service:  (none) Author Type:  NURS- Registered Nurse     Filed:  2018  2:53 PM Encounter Date:  2018 Status:  Signed     :  Kristina Lamb RN (NURS- Registered Nurse)            Request physician consideration Ferrous Sulfate 65 mg as requested.   Office visit in the past 12 months or per provider note.  Last visit with BLAINE ASH was on: 2017 in [facility]  Next visit with BLAINE ASH is on: No future appointment listed with this provider  Next visit with Internal Medicine is on: No future appointment listed in this department  Lab test requirements:  Annual hemoglobin.  HEMOGLOBIN                (g/dL)    Date Value   2017 7.3 (L)   Max refill for 12 months from last office visit or per provider note.  Unable to complete prescription refill per RN Medication Refill Policy.................... Kristina Lamb RN ....................  2018   2:50 PM          Calcium Channel Blockers  Office visit in the past 12 months or per provider note.  Last visit with BLAINE ASH was on: 2017 in [facility]  Next visit with BLAINE ASH is on: No future appointment listed with this provider  Next visit with Internal Medicine is on: No future appointment listed in this department  BP Readings from Last 4 Encounters:    17 158/88   17 167/84   17 130/78   11/10/17 142/84   Review last provider visit note.  If BP reviewed and plan is noted, can refill.  Max refill for 12 months from last office visit or per provider note.  Prescription refilled per RN Medication Refill Policy.................... Kristina Lamb RN ....................  2018   2:46 PM

## 2018-03-13 NOTE — TELEPHONE ENCOUNTER
Tod, nurse at [facility] assisted living calling and states that daughter was in to see patient and daughter states that patient had told her that she was having blood in her urine.    Daughter states that about 4 months ago Dr. Morrison had hand written a standing order for UA/UC when needed.    Tod states they cannot find order and wondering if Dr. Morrison would order UA/UC.    Tod spoke with patient and she states she had the blood in urine and frequency. Denies pain or fever.    Jayleen Galeana RN on 3/13/2018 at 3:53 PM

## 2018-03-13 NOTE — TELEPHONE ENCOUNTER
Contacted the patients living facility and gave them the ok that orders were placed for the UA.  KIM HOYOS LPN 3/13/2018 4:42 PM

## 2018-07-23 NOTE — PROGRESS NOTES
Patient Information     Patient Name  Marisol Martin MRN  [id number] Sex  Female   10/5/1930      Letter by Paul Morrison MD at      Author:  Paul Morrison MD Service:  (none) Author Type:  (none)    Filed:   Encounter Date:  3/15/2017 Status:  (Other)           Marisol Martin  [address] [facility] [address]          March 15, 2017    Dear Ms. Martin:    Following are the tests completed during your last clinic visit:    Results for orders placed or performed in visit on 03/15/17      HEMOGLOBIN      Result  Value Ref Range    HEMOGLOBIN                9.0 (L) 12.0 - 16.0 g/dL    MCV                       97 80 - 100 fL   BASIC METABOLIC PANEL      Result  Value Ref Range    SODIUM 143 133 - 143 mmol/L    POTASSIUM 4.5 3.5 - 5.1 mmol/L    CHLORIDE 111 (H) 98 - 107 mmol/L    CO2,TOTAL 22 21 - 31 mmol/L    ANION GAP 10 5 - 18                    GLUCOSE 89 70 - 105 mg/dL    CALCIUM 8.9 8.6 - 10.3 mg/dL    BUN 50 (H) 7 - 25 mg/dL    CREATININE 2.39 (H) 0.70 - 1.30 mg/dL    BUN/CREAT RATIO           21                    GFR if African American 23 (L) >60 ml/min/1.73m2    GFR if not African American 19 (L) >60 ml/min/1.73m2   FERRITIN      Result  Value Ref Range    FERRITIN 111.8 23.9 - 336.2 ng/mL   IRON      Result  Value Ref Range    IRON 68 50 - 212 ug/dL         You are still anemic, but your iron levels are fine. There is really no benefit to taking an iron supplement at this time so that is good news. You are anemic mostly because of your kidney disease. Your kidney function is weakened but is remaining stable. Let's plan to have you back in 3-4 months to recheck these values.    Sincerely,      Paul Morrison MD  Internal Medicine  [facility] and [facility]

## 2018-07-23 NOTE — PROGRESS NOTES
Patient Information     Patient Name  Marisol Martin MRN  [id number] Sex  Female   10/5/1930      Letter by Paul Morrison MD at      Author:  Paul Morrison MD Service:  (none) Author Type:  (none)    Filed:   Encounter Date:  11/10/2017 Status:  (Other)           Marisol Martin  [facility]  [address] [place] [id number]          2017    Dear Ms. Martin:    Following are the tests completed during your last clinic visit:    Results for orders placed or performed in visit on 11/10/17      HEMOGLOBIN      Result  Value Ref Range    HEMOGLOBIN                8.1 (L) 12.0 - 16.0 g/dL    MCV                       95 80 - 100 fL   BASIC METABOLIC PANEL      Result  Value Ref Range    SODIUM 138 133 - 143 mmol/L    POTASSIUM 4.3 3.5 - 5.1 mmol/L    CHLORIDE 113 (H) 98 - 107 mmol/L    CO2,TOTAL 18 (L) 21 - 31 mmol/L    ANION GAP 7 5 - 18                    GLUCOSE 137 (H) 70 - 105 mg/dL    CALCIUM 8.3 (L) 8.6 - 10.3 mg/dL    BUN 70 (H) 7 - 25 mg/dL    CREATININE 3.71 (H) 0.70 - 1.30 mg/dL    BUN/CREAT RATIO           19                    GFR if African American 14 (L) >60 ml/min/1.73m2    GFR if not African American 12 (L) >60 ml/min/1.73m2   IRON PLUS IRON BINDING CAP      Result  Value Ref Range    IRON 54 50 - 212 ug/dL    UIBC (UNSATURATED)  199.68 mg/dL    IRON BINDING CAPACITY, CALCULATED  253.68 245.00 - 400.00 ug/dL    IRON, % SATURATION  21 20 - 55 %   VITAMIN B12      Result  Value Ref Range    VITAMIN B12 298 180 - 914 pg/mL   FERRITIN      Result  Value Ref Range    FERRITIN 142.4 23.9 - 336.2 ng/mL   FOLIC ACID      Result  Value Ref Range    FOLIC ACID 14.7 >=5.2 ng/mL         Your recent blood tests show that your iron levels as well as your B12 and folic acid levels are normal. These tests were to further evaluate your anemia. It appears that your anemia is in a large part due to your weak kidneys. We will continue to follow this on a regular basis.    Sincerely,      Paul  MD Tamiko  Internal Medicine  [facility]

## 2018-07-23 NOTE — PROGRESS NOTES
Patient Information     Patient Name  Marisol Martin MRN  [id number] Sex  Female   10/5/1930      Letter by Paul Morrison MD at      Author:  Paul Morrison MD Service:  (none) Author Type:  (none)    Filed:   Encounter Date:  2017 Status:  (Other)           Marisol Martin  [address] [facility] [address]          2017    Dear Ms. Martin:    Following are the tests completed during your last clinic visit:    Results for orders placed or performed in visit on 17      TSH      Result  Value Ref Range    TSH 1.27 0.34 - 5.60 uIU/mL   T4,FREE      Result  Value Ref Range    T4,FREE 1.01 0.58 - 1.64 ng/dL   HEMOGLOBIN A1C MONITORING (POCT)      Result  Value Ref Range    HEMOGLOBIN A1C MONITORING (POCT) 5.3 4.0 - 6.2 %    ESTIMATED AVERAGE GLUCOSE  105 mg/dL   HEMOGLOBIN      Result  Value Ref Range    HEMOGLOBIN                9.6 (L) 12.0 - 16.0 g/dL    MCV                       100 80 - 100 fL   BASIC METABOLIC PANEL      Result  Value Ref Range    SODIUM 138 133 - 143 mmol/L    POTASSIUM 4.6 3.5 - 5.1 mmol/L    CHLORIDE 107 98 - 107 mmol/L    CO2,TOTAL 22 21 - 31 mmol/L    ANION GAP 9 5 - 18                    GLUCOSE 91 70 - 105 mg/dL    CALCIUM 9.1 8.6 - 10.3 mg/dL    BUN 44 (H) 7 - 25 mg/dL    CREATININE 2.38 (H) 0.70 - 1.30 mg/dL    BUN/CREAT RATIO           18                    GFR if African American 23 (L) >60 ml/min/1.73m2    GFR if not African American 19 (L) >60 ml/min/1.73m2         Your blood tests have returned showing that your kidneys continue to be weak but are generally stable. You are more anemic than you've been in the past. I would suggest that you start on an iron supplement and take that daily. We should have you back to recheck your anemia and your kidneys again in approximately 6 weeks.    Sincerely,      Paul Morrison MD  Internal Medicine  [facility]

## 2018-07-24 NOTE — PROGRESS NOTES
Patient Information     Patient Name  Marisol Martin MRN  [id number] Sex  Female   10/5/1930      Letter by Paul Morrison MD at      Author:  Paul Morrison MD Service:  (none) Author Type:  (none)    Filed:   Encounter Date:  2017 Status:  (Other)           Marisol Martin  [address] [facility] [address]          2017    Dear Ms. Martin:    Following are the tests completed during your last clinic visit:    Results for orders placed or performed in visit on 17      BASIC METABOLIC PANEL      Result  Value Ref Range    SODIUM 141 133 - 143 mmol/L    POTASSIUM 4.8 3.5 - 5.1 mmol/L    CHLORIDE 108 (H) 98 - 107 mmol/L    CO2,TOTAL 19 (L) 21 - 31 mmol/L    ANION GAP 14 5 - 18                    GLUCOSE 86 70 - 105 mg/dL    CALCIUM 8.9 8.6 - 10.3 mg/dL    BUN 60 (H) 7 - 25 mg/dL    CREATININE 2.79 (H) 0.70 - 1.30 mg/dL    BUN/CREAT RATIO           22                    GFR if African American 19 (L) >60 ml/min/1.73m2    GFR if not African American 16 (L) >60 ml/min/1.73m2   CBC WITH AUTO DIFFERENTIAL      Result  Value Ref Range    WHITE BLOOD COUNT         6.6 4.5 - 11.0 thou/cu mm    RED BLOOD COUNT           2.66 (L) 4.00 - 5.20 mil/cu mm    HEMOGLOBIN                8.5 (L) 12.0 - 16.0 g/dL    HEMATOCRIT                25.7 (L) 33.0 - 51.0 %    MCV                       97 80 - 100 fL    MCH                       32.0 26.0 - 34.0 pg    MCHC                      33.1 32.0 - 36.0 g/dL    RDW                       14.1 11.5 - 15.5 %    PLATELET COUNT            239 140 - 440 thou/cu mm    MPV                       8.8 6.5 - 11.0 fL    NEUTROPHILS               49.5 42.0 - 72.0 %    LYMPHOCYTES               37.1 20.0 - 44.0 %    MONOCYTES                 9.0 <12.0 %    EOSINOPHILS               3.5 <8.0 %    BASOPHILS                 0.6 <3.0 %    IMMATURE GRANULOCYTES(METAS,MYELOS,PROS) 0.3 %    ABSOLUTE NEUTROPHILS      3.2 1.7 - 7.0 thou/cu mm    ABSOLUTE LYMPHOCYTES      2.4 0.9 - 2.9  thou/cu mm    ABSOLUTE MONOCYTES        0.6 <0.9 thou/cu mm    ABSOLUTE EOSINOPHILS      0.2 <0.5 thou/cu mm    ABSOLUTE BASOPHILS        0.0 <0.3 thou/cu mm    ABSOLUTE IMMATURE GRANULOCYTES(METAS,MYELOS,PROS) 0.0 <=0.3 thou/cu mm   URINALYSIS W REFLEX MICROSCOPIC IF POSITIVE      Result  Value Ref Range    COLOR                     Yellow Yellow Color    CLARITY                   Clear Clear Clarity    SPECIFIC GRAVITY,URINE    1.020 1.010, 1.015, 1.020, 1.025                    PH,URINE                  6.0 6.0, 7.0, 8.0, 5.5, 6.5, 7.5, 8.5                    UROBILINOGEN,QUALITATIVE  Normal Normal EU/dl    PROTEIN, URINE 100 (A) Negative mg/dL    GLUCOSE, URINE Negative Negative mg/dL    KETONES,URINE             Negative Negative mg/dL    BILIRUBIN,URINE           Negative Negative                    OCCULT BLOOD,URINE        Small (A) Negative                    NITRITE                   Negative Negative                    LEUKOCYTE ESTERASE        Small (A) Negative                   URINALYSIS MICROSCOPIC      Result  Value Ref Range    RBC 0-2 0-2, None Seen /HPF    WBC 6-10 (A) 0-2, 3-5, None Seen /HPF    BACTERIA                  Few None Seen, Rare, Occasional, Few Bacteria/HPF    EPITHELIAL CELLS          Few None Seen, Few Epi/HPF    RENAL EPITHELIAL CELLS Few None Seen, Few         Your blood and urine tests have returned. You are still fairly anemic, this has remained relatively stable for quite some time and is likely related to your kidney weakness. Your kidneys have improved slightly compared to when you were in the emergency room, however. Your urine testing looks acceptable.    I would recommend no changes at the present time. I want you to return in 2 months so that we can recheck your anemia and your kidney function. If you have questions prior to then, feel free to contact me.    Sincerely,      Paul Morrison MD  Internal Medicine  [facility] and [facility]

## 2018-07-24 NOTE — PROGRESS NOTES
Patient Information     Patient Name  Marisol Martin MRN  [id number] Sex  Female   10/5/1930      Letter by Paul Morrison MD at      Author:  Paul Morrison MD Service:  (none) Author Type:  (none)    Filed:   Encounter Date:  10/25/2017 Status:  (Other)           Marisol Martin  [address] [address]          2017    Dear Ms. Martin:    Following are the tests completed during your last clinic visit:    Results for orders placed or performed in visit on 10/25/17      URINALYSIS W REFLEX MICROSCOPIC IF POSITIVE      Result  Value Ref Range    COLOR                     Yellow Yellow Color    CLARITY                   Cloudy (A) Clear Clarity    SPECIFIC GRAVITY,URINE    1.020 1.010, 1.015, 1.020, 1.025                    PH,URINE                  6.0 6.0, 7.0, 8.0, 5.5, 6.5, 7.5, 8.5                    UROBILINOGEN,QUALITATIVE  Normal Normal EU/dl    PROTEIN, URINE 100 (A) Negative mg/dL    GLUCOSE, URINE 100 (A) Negative mg/dL    KETONES,URINE             Negative Negative mg/dL    BILIRUBIN,URINE           Negative Negative                    OCCULT BLOOD,URINE        Small (A) Negative                    NITRITE                   Positive (A) Negative                    LEUKOCYTE ESTERASE        Moderate (A) Negative                   URINALYSIS MICROSCOPIC      Result  Value Ref Range    RBC 3-5 (A) 0-2, None Seen /HPF    WBC >100 (A) 0-2, 3-5, None Seen /HPF    BACTERIA                  Moderate (A) None Seen, Rare, Occasional, Few Bacteria/HPF    EPITHELIAL CELLS          Few None Seen, Few Epi/HPF   BASIC METABOLIC PANEL      Result  Value Ref Range    SODIUM 138 133 - 143 mmol/L    POTASSIUM 5.5 (H) 3.5 - 5.1 mmol/L    CHLORIDE 110 (H) 98 - 107 mmol/L    CO2,TOTAL 18 (L) 21 - 31 mmol/L    ANION GAP 10 5 - 18                    GLUCOSE 83 70 - 105 mg/dL    CALCIUM 8.2 (L) 8.6 - 10.3 mg/dL    BUN 69 (H) 7 - 25 mg/dL    CREATININE 3.74 (H) 0.70 - 1.30 mg/dL     BUN/CREAT RATIO           18                    GFR if African American 14 (L) >60 ml/min/1.73m2    GFR if not African American 11 (L) >60 ml/min/1.73m2   CBC WITH AUTO DIFFERENTIAL      Result  Value Ref Range    WHITE BLOOD COUNT         7.3 4.5 - 11.0 thou/cu mm    RED BLOOD COUNT           2.53 (L) 4.00 - 5.20 mil/cu mm    HEMOGLOBIN                7.8 (L) 12.0 - 16.0 g/dL    HEMATOCRIT                24.3 (L) 33.0 - 51.0 %    MCV                       96 80 - 100 fL    MCH                       30.8 26.0 - 34.0 pg    MCHC                      32.1 32.0 - 36.0 g/dL    RDW                       14.7 11.5 - 15.5 %    PLATELET COUNT            231 140 - 440 thou/cu mm    MPV                       8.7 6.5 - 11.0 fL    NEUTROPHILS               56.6 42.0 - 72.0 %    LYMPHOCYTES               28.8 20.0 - 44.0 %    MONOCYTES                 9.8 <12.0 %    EOSINOPHILS               4.1 <8.0 %    BASOPHILS                 0.4 <3.0 %    IMMATURE GRANULOCYTES(METAS,MYELOS,PROS) 0.3 %    ABSOLUTE NEUTROPHILS      4.1 1.7 - 7.0 thou/cu mm    ABSOLUTE LYMPHOCYTES      2.1 0.9 - 2.9 thou/cu mm    ABSOLUTE MONOCYTES        0.7 <0.9 thou/cu mm    ABSOLUTE EOSINOPHILS      0.3 <0.5 thou/cu mm    ABSOLUTE BASOPHILS        0.0 <0.3 thou/cu mm    ABSOLUTE IMMATURE GRANULOCYTES(METAS,MYELOS,PROS) 0.0 <=0.3 thou/cu mm         Your kidney tests have worsened. Your anemia has worsened as well. I am going to have you stop your aspirin and lisinopril. Am going to have you start taking iron daily and I you to return in 2 weeks for a recheck.    Sincerely,      Paul Morrison MD  Internal Medicine  [facility] and [facility]

## 2021-06-08 NOTE — TELEPHONE ENCOUNTER
Patient Information     Patient Name MRN Sex Marisol Ryan [id number] Female 10/5/1930      Telephone Encounter by Jayleen Rosenbaum RN at 10/5/2017  9:35 AM     Author:  Jayleen Rosenbaum RN Service:  (none) Author Type:  NURS- Registered Nurse     Filed:  10/5/2017  9:37 AM Encounter Date:  10/2/2017 Status:  Signed     :  Jayleen Rosenbaum RN (NURS- Registered Nurse)            meclizine (ANTIVERT) 12.5 mg tablet  TAKE 1 TABLET BY MOUTH ONCE DAILY AS NEEDED       Disp: 1 tablet Refills:     Class: eRx Start: 10/2/2017    Documented:5 years ago  Last refill:2017  To pharmacy: URGENT  To be filled at: [facility] [address] [facility] [address]: [phone]    Last visit with BLAINE ASH was on: 2017 in [facility]  PCP:  Blaine Ash MD    Current medication list instructions are take one tablet by mouth every 6 hours as needed back in .    Request is for once daily if needed.    Unable to complete prescription refill per RN Medication Refill Policy.................... JAYLEEN ROSENBAUM RN ....................  10/5/2017   9:35 AM             Principal Discharge DX:	Chest pain  Secondary Diagnosis:	Hypertension